# Patient Record
Sex: MALE | Race: WHITE | NOT HISPANIC OR LATINO | Employment: FULL TIME | URBAN - METROPOLITAN AREA
[De-identification: names, ages, dates, MRNs, and addresses within clinical notes are randomized per-mention and may not be internally consistent; named-entity substitution may affect disease eponyms.]

---

## 2020-01-27 ENCOUNTER — TRANSFERRED RECORDS (OUTPATIENT)
Dept: HEALTH INFORMATION MANAGEMENT | Facility: CLINIC | Age: 35
End: 2020-01-27

## 2020-01-27 ENCOUNTER — MEDICAL CORRESPONDENCE (OUTPATIENT)
Dept: HEALTH INFORMATION MANAGEMENT | Facility: CLINIC | Age: 35
End: 2020-01-27

## 2020-01-29 ENCOUNTER — PRE VISIT (OUTPATIENT)
Dept: NEUROLOGY | Facility: CLINIC | Age: 35
End: 2020-01-29

## 2020-01-29 NOTE — TELEPHONE ENCOUNTER
FUTURE VISIT INFORMATION      FUTURE VISIT INFORMATION:    Date: 3/11/2020    Time: 9AM    [place] [facility] [place]:    Referring provider:  Alka Richard    Referring providers clinic:  [facility]     Reason for visit/diagnosis  Migraines     RECORDS REQUESTED FROM:       Clinic name Comments Records Status Imaging Status   [facility]   Scanned to Media tab N/A     *No records In Care Everywhere*

## 2020-03-10 ASSESSMENT — ENCOUNTER SYMPTOMS
HEADACHES: 1
RECTAL PAIN: 1

## 2020-03-10 ASSESSMENT — HEADACHE IMPACT TEST (HIT 6)
WHEN YOU HAVE HEADACHES HOW OFTEN IS THE PAIN SEVERE: SOMETIMES
HIT6 TOTAL SCORE: 58
WHEN YOU HAVE A HEADACHE HOW OFTEN DO YOU WISH YOU COULD LIE DOWN: SOMETIMES
HOW OFTEN HAVE YOU FELT FED UP OR IRRITATED BECAUSE OF YOUR HEADACHES: SOMETIMES
HOW OFTEN DO HEADACHES LIMIT YOUR DAILY ACTIVITIES: SOMETIMES
HOW OFTEN DID HEADACHS LIMIT CONCENTRATION ON WORK OR DAILY ACTIVITY: SOMETIMES
HOW OFTEN HAVE YOU FELT TOO TIRED TO WORK BECAUSE OF YOUR HEADACHES: RARELY

## 2020-03-11 ENCOUNTER — OFFICE VISIT (OUTPATIENT)
Dept: NEUROLOGY | Facility: CLINIC | Age: 35
End: 2020-03-11
Payer: COMMERCIAL

## 2020-03-11 ENCOUNTER — HEALTH MAINTENANCE LETTER (OUTPATIENT)
Age: 35
End: 2020-03-11

## 2020-03-11 VITALS
BODY MASS INDEX: 26.25 KG/M2 | OXYGEN SATURATION: 97 % | TEMPERATURE: 98 F | HEART RATE: 77 BPM | WEIGHT: 187.5 LBS | SYSTOLIC BLOOD PRESSURE: 122 MMHG | HEIGHT: 71 IN | RESPIRATION RATE: 16 BRPM | DIASTOLIC BLOOD PRESSURE: 86 MMHG

## 2020-03-11 DIAGNOSIS — R51.9 CHRONIC DAILY HEADACHE: Primary | ICD-10-CM

## 2020-03-11 PROBLEM — G43.909 MIGRAINE HEADACHE: Status: ACTIVE | Noted: 2020-03-11

## 2020-03-11 RX ORDER — IBUPROFEN 200 MG
200 TABLET ORAL EVERY 4 HOURS PRN
COMMUNITY
End: 2020-05-01

## 2020-03-11 RX ORDER — AMITRIPTYLINE HYDROCHLORIDE 10 MG/1
10 TABLET ORAL AT BEDTIME
Qty: 30 TABLET | Refills: 6 | Status: SHIPPED | OUTPATIENT
Start: 2020-03-11 | End: 2020-05-01

## 2020-03-11 RX ORDER — CYCLOBENZAPRINE HCL 5 MG
5 TABLET ORAL AT BEDTIME
COMMUNITY
End: 2020-05-01

## 2020-03-11 RX ORDER — SUMATRIPTAN 100 MG/1
100 TABLET, FILM COATED ORAL
COMMUNITY
End: 2020-05-01

## 2020-03-11 RX ORDER — LEVETIRACETAM 1000 MG/1
1000 TABLET ORAL 2 TIMES DAILY
COMMUNITY
End: 2020-03-11

## 2020-03-11 ASSESSMENT — PAIN SCALES - GENERAL: PAINLEVEL: MILD PAIN (2)

## 2020-03-11 ASSESSMENT — MIFFLIN-ST. JEOR: SCORE: 1822.62

## 2020-03-11 NOTE — PATIENT INSTRUCTIONS
Plan:  Stop ibuprofen. May try naproxen 1-2 tabs every 12 hours for mild-moderate headache as needed and if tolerated. Limit use to no more than 10 days per month.   Stay hydrated   A trial of amitriptyline 10 mg at bedtime for headache prevention.   Stop cyclobenzaprine  Sumatriptan as needed for acute migraine headache. Limit use to no more than 9 days per month.   Follow up in 6-8 weeks or sooner if needed        Patient Education     Amitriptyline Hydrochloride Oral tablet  What is this medicine?  AMITRIPTYLINE (a john TRIP ti todd) is used to treat depression.  This medicine may be used for other purposes; ask your health care provider or pharmacist if you have questions.  What should I tell my health care provider before I take this medicine?  They need to know if you have any of these conditions:    an alcohol problem    asthma, difficulty breathing    bipolar disorder or schizophrenia    difficulty passing urine, prostate trouble    glaucoma    heart disease or previous heart attack    liver disease    over active thyroid    seizures    thoughts or plans of suicide, a previous suicide attempt, or family history of suicide attempt    an unusual or allergic reaction to amitriptyline, other medicines, foods, dyes, or preservatives    pregnant or trying to get pregnant    breast-feeding  How should I use this medicine?  Take this medicine by mouth with a drink of water. Follow the directions on the prescription label. You can take the tablets with or without food. Take your medicine at regular intervals. Do not take it more often than directed. Do not stop taking this medicine suddenly except upon the advice of your doctor. Stopping this medicine too quickly may cause serious side effects or your condition may worsen.  A special MedGuide will be given to you by the pharmacist with each prescription and refill. Be sure to read this information carefully each time.  Talk to your pediatrician regarding the use of  this medicine in children. Special care may be needed.  Overdosage: If you think you have taken too much of this medicine contact a poison control center or emergency room at once.  NOTE: This medicine is only for you. Do not share this medicine with others.  What if I miss a dose?  If you miss a dose, take it as soon as you can. If it is almost time for your next dose, take only that dose. Do not take double or extra doses.  What may interact with this medicine?  Do not take this medicine with any of the following medications:    arsenic trioxide    certain medicines used to regulate abnormal heartbeat or to treat other heart conditions    cisapride    droperidol    halofantrine    linezolid    MAOIs like Carbex, Eldepryl, Marplan, Nardil, and Parnate    methylene blue    other medicines for mental depression    phenothiazines like perphenazine, thioridazine and chlorpromazine    pimozide    probucol    procarbazine    sparfloxacin    Edmond's Wort    ziprasidone  This medicine may also interact with the following medications:    atropine and related drugs like hyoscyamine, scopolamine, tolterodine and others    barbiturate medicines for inducing sleep or treating seizures, like phenobarbital    cimetidine    disulfiram    ethchlorvynol    thyroid hormones such as levothyroxine  This list may not describe all possible interactions. Give your health care provider a list of all the medicines, herbs, non-prescription drugs, or dietary supplements you use. Also tell them if you smoke, drink alcohol, or use illegal drugs. Some items may interact with your medicine.  What should I watch for while using this medicine?  Tell your doctor if your symptoms do not get better or if they get worse. Visit your doctor or health care professional for regular checks on your progress. Because it may take several weeks to see the full effects of this medicine, it is important to continue your treatment as prescribed by your  doctor.  Patients and their families should watch out for new or worsening thoughts of suicide or depression. Also watch out for sudden changes in feelings such as feeling anxious, agitated, panicky, irritable, hostile, aggressive, impulsive, severely restless, overly excited and hyperactive, or not being able to sleep. If this happens, especially at the beginning of treatment or after a change in dose, call your health care professional.  You may get drowsy or dizzy. Do not drive, use machinery, or do anything that needs mental alertness until you know how this medicine affects you. Do not stand or sit up quickly, especially if you are an older patient. This reduces the risk of dizzy or fainting spells. Alcohol may interfere with the effect of this medicine. Avoid alcoholic drinks.  Do not treat yourself for coughs, colds, or allergies without asking your doctor or health care professional for advice. Some ingredients can increase possible side effects.  Your mouth may get dry. Chewing sugarless gum or sucking hard candy, and drinking plenty of water will help. Contact your doctor if the problem does not go away or is severe.  This medicine may cause dry eyes and blurred vision. If you wear contact lenses you may feel some discomfort. Lubricating drops may help. See your eye doctor if the problem does not go away or is severe.  This medicine can cause constipation. Try to have a bowel movement at least every 2 to 3 days. If you do not have a bowel movement for 3 days, call your doctor or health care professional.  This medicine can make you more sensitive to the sun. Keep out of the sun. If you cannot avoid being in the sun, wear protective clothing and use sunscreen. Do not use sun lamps or tanning beds/booths.  What side effects may I notice from receiving this medicine?  Side effects that you should report to your doctor or health care professional as soon as possible:    allergic reactions like skin rash,  itching or hives, swelling of the face, lips, or tongue    abnormal production of milk in females    breast enlargement in both males and females    breathing problems    confusion, hallucinations    fast, irregular heartbeat    fever with increased sweating    muscle stiffness, or spasms    pain or difficulty passing urine, loss of bladder control    seizures    suicidal thoughts or other mood changes    swelling of the testicles    tingling, pain, or numbness in the feet or hands    yellowing of the eyes or skin  Side effects that usually do not require medical attention (report to your doctor or health care professional if they continue or are bothersome):    change in sex drive or performance    constipation or diarrhea    nausea, vomiting    weight gain or loss  This list may not describe all possible side effects. Call your doctor for medical advice about side effects. You may report side effects to FDA at 8-386-FDA-8146.  Where should I keep my medicine?  Keep out of the reach of children.  Store at room temperature between 20 and 25 degrees C (68 and 77 degrees F). Throw away any unused medicine after the expiration date.  NOTE:This sheet is a summary. It may not cover all possible information. If you have questions about this medicine, talk to your doctor, pharmacist, or health care provider. Copyright  2016 Gold Standard

## 2020-03-11 NOTE — PROGRESS NOTES
"Re: Dustin Rainey      MRN# [id number]  YOB: 1985  Date of Visit:3/11/2020     OUTPATIENT NEUROLOGY VISIT NOTE    Chief Complaint:  Headache evaluation    History of Present Illness  Dustin Rainey is a 35-year-old male presents to the clinic today for headache evaluation.   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   Headache History:    Onset History:  Regularly for the past a couple of years years. Grandmother would get headaches.   Current Headache Pattern:  daily and typically takes advil and \"responsive\" to \"advil\" and intense headaches 1-2 times per month and lasting 1-2 days and sumatriptan helps to eliminate severe headaches       Aura: none     Associated Symptoms:  nausea and light sensitivity -with migraine headaches        Description of Headache Pain & Location:  Daily and migraine headaches are typically temples both or either side and in the back of his head -pain from a dull daily pain to pulsing at times, pain ranges 2-7/10 on the numeric pain scale    Do headaches interfere with or prevent usual activities or diminish your productivity at home or work? affected patient's work       Treatments Tried:    Ibuprofen 200-600 mg per day for at least a couple of years  Sumatriptan as needed and helps for severe migraine headaches  Flexeril as needed  Chiropractor   Nortriptyline in 2018 and did not work   No other preventive treatments  Have you needed to utilize the Emergency Room to treat your headache symptoms?  no    What makes your headaches better?  Sleeping headaches off  What makes your headaches worse or triggers your headaches? 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     Hit his head in middle school and got staples     Denies history of recent  head or neck trauma, " dizziness, vertigo, loss of consciousness, seizure, double vision, blurred vision, hearing difficulty, speech or swallowing difficulty, weakness or numbness in face, arms or legs, urinary or bowel incontinence, coordination problems or gait difficulty, fever or chills.    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        Review of Systems:   A 12-point ROS including constitutional, eyes, ENT, respiratory, cardiovascular, gastroenterology, genitourinary, integumentary, musculoskeletal, neurology, hematology and psychiatric were all reviewed with the patient and completed at the Neuroscience Services Question nary and as mentioned in the HPI.   Answers for HPI/ROS submitted by the patient on 3/10/2020   General Symptoms: No  Skin Symptoms: No  HENT Symptoms: No  EYE SYMPTOMS: No  HEART SYMPTOMS: No  LUNG SYMPTOMS: No  INTESTINAL SYMPTOMS: Yes  URINARY SYMPTOMS: No  REPRODUCTIVE SYMPTOMS: No  SKELETAL SYMPTOMS: No  BLOOD SYMPTOMS: No  NERVOUS SYSTEM SYMPTOMS: Yes  MENTAL HEALTH SYMPTOMS: No  Headache: Yes    General Exam:   /86 (BP Location: Left arm, Patient Position: Chair, Cuff Size: Adult Large)    "Pulse 77   Temp 98  F (36.7  C) (Oral)   Resp 16   Ht 1.803 m (5' 11\")   Wt 85 kg (187 lb 8 oz)   SpO2 97%   BMI 26.15 kg/m    GEN: Awake, NAD; good eye contact, responses appropriately, healthy appearing   HEENT: Head atraumatic/Normocephalic. Scalp normal. Pupils equally round, 4 mm, reactive to light and accommodation, sclera and conjunctiva normal. Fundoscopic examination reveals normal vessels no papilledema.   Neck: Easily moveable without resistance  Heart: S1/S2 appreciated, RRR, no m/r/g, no carotid bruits  Lungs:Lungs are clear to auscultation bilaterally, no wheezes or crackles.    Neurological Examination:  The patient is alert and oriented times four. Has good attention and concentration.  Speech is fluent without dysarthria.   Cranial nerves:  CN I deferred.   CN II: Intact and full visual fields to confrontation bilaterally. CN III, IV, VI: EOM intact. There is no nystagmus. Has conjugated gaze. Intact direct and consensual pupillary light reflexes.   CN V: Intact and symmetrical to facial sensation in the V1 through V3 bilaterally.   CN VII: Intact and symmetrical eyebrow and lid raise and eyelid closure, smiles and frown.   CN VIII: Intact to finger rub bilaterally.   CN IX and X: The palates elevates symmetrical. The uvula is midline.   CN XII: The tongue protrudes midline with no atrophy or fasciculations.   Motor exam: The patient has a normal bulk and tone throughout. There is no atrophy, fasciculations, clonus, or abnormal movements appreciated.   Strength Exam:  5/5 strength at shoulder abduction, elbow flexion or extension, wrist flexion or extension, finger abduction, , hip flexion and extension, knee flexion and extension, and dorsiflexion and plantarflexion bilaterally.   Sensation is intact to light touch  throughout. Reflexes are 2+ and symmetrical at biceps, triceps, brachioradialis, patellar, and Achilles.   Coordination reveals finger-nose-finger with normal speed and " accuracy.   Station and gait is normal. There is no ataxia. Can walk on the toes, heels, and tandem walk without difficulty.Has no drift and a negative Romberg.     Assessment and Plan:  Headache mixed etiology-chronic daily headaches,  migraine headaches and possible analgesics overuse headaches On exam he has normal vital signs.  His mental status is normal.  His funduscopic exam is normal.  His neck is supple.  His neurologic exam is normal.The remainder of a complete physical exam is normal.    Plan discussed with the patient:  Stop ibuprofen. May try naproxen 1-2 tabs every 12 hours for mild-moderate headache as needed and if tolerated. Limit use to no more than 10 days per month.   Stay hydrated   A trial of amitriptyline 10 mg at bedtime for headache prevention.   Stop cyclobenzaprine  Sumatriptan as needed for acute migraine headache. Limit use to no more than 9 days per month.   Follow up in 6-8 weeks or sooner if needed    Prescription amitriptyline  provided. Correct use and course provided. Expected benefits and typical side effects reviewed. Safety of concomitant medications and interactions reviewed. Patient taught signs and symptoms of adverse reactions and allergies. Patient understands teaching and accepts risks of prescribed medication regimen.    I discussed all my recommendation with Dustin Rainey. The patient verbalizes understanding and comfortable with the plan. The patient has our clinic phone number to call with any questions or concerns. All of the patient's questions were answered from the best of my current knowledge.     Thank you for letting me be a part of the treatment team for Dustin Rainey      Time spent with pt answering questions, discussing findings, counseling and coordinating care was more than 50% the appointment time, 52  minutes.         KEVIN Shaw, CNP  [facility]

## 2020-03-11 NOTE — NURSING NOTE
Chief Complaint   Patient presents with     Headache     [facility] NEW HEADACHE MIGRAINES     Maryana Menezes CMA

## 2020-03-11 NOTE — LETTER
"3/11/2020       RE: Dustin Rainey  [address]  [address]  [place] [address]     Dear Colleague,    Thank you for referring your patient, Dustin Rainey, to the [facility] at [facility]. Please see a copy of my visit note below.    Re: Dustin Rainey      MRN# [id number]  YOB: 1985  Date of Visit:3/11/2020     OUTPATIENT NEUROLOGY VISIT NOTE    Chief Complaint:  Headache evaluation    History of Present Illness  Dustin Rainey is a 35-year-old male presents to the clinic today for headache evaluation.   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   Headache History:    Onset History:  Regularly for the past a couple of years years. Grandmother would get headaches.   Current Headache Pattern:  daily and typically takes advil and \"responsive\" to \"advil\" and intense headaches 1-2 times per month and lasting 1-2 days and sumatriptan helps to eliminate severe headaches       Aura: none     Associated Symptoms:  nausea and light sensitivity -with migraine headaches        Description of Headache Pain & Location:  Daily and migraine headaches are typically temples both or either side and in the back of his head -pain from a dull daily pain to pulsing at times, pain ranges 2-7/10 on the numeric pain scale    Do headaches interfere with or prevent usual activities or diminish your productivity at home or work? affected patient's work       Treatments Tried:    Ibuprofen 200-600 mg per day for at least a couple of years  Sumatriptan as needed and helps for severe migraine headaches  Flexeril as needed  Chiropractor   Nortriptyline in 2018 and did not work   No other preventive treatments  Have you needed to utilize the Emergency Room to treat your headache symptoms?  no    What makes your headaches better?  Sleeping headaches off  What makes your headaches worse or triggers your headaches? 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     Hit his head in middle school and got staples     Denies history of recent  head or neck trauma, dizziness, vertigo, loss of consciousness, seizure, double vision, blurred vision, hearing difficulty, speech or swallowing difficulty, weakness or numbness in face, arms or legs, urinary or bowel incontinence, coordination problems or gait difficulty, fever or chills.    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        Review of Systems:   A 12-point ROS including constitutional, eyes, ENT, respiratory, cardiovascular, gastroenterology, genitourinary, integumentary, musculoskeletal, neurology, hematology and psychiatric were all reviewed with the patient and completed at the Neuroscience Services Question nary and as mentioned in the HPI.   Answers for HPI/ROS submitted by the patient on 3/10/2020   General Symptoms: No  Skin Symptoms: No  HENT Symptoms: No  EYE SYMPTOMS: No  HEART SYMPTOMS: No  LUNG SYMPTOMS:  "No  INTESTINAL SYMPTOMS: Yes  URINARY SYMPTOMS: No  REPRODUCTIVE SYMPTOMS: No  SKELETAL SYMPTOMS: No  BLOOD SYMPTOMS: No  NERVOUS SYSTEM SYMPTOMS: Yes  MENTAL HEALTH SYMPTOMS: No  Headache: Yes    General Exam:   /86 (BP Location: Left arm, Patient Position: Chair, Cuff Size: Adult Large)   Pulse 77   Temp 98  F (36.7  C) (Oral)   Resp 16   Ht 1.803 m (5' 11\")   Wt 85 kg (187 lb 8 oz)   SpO2 97%   BMI 26.15 kg/m    GEN: Awake, NAD; good eye contact, responses appropriately, healthy appearing   HEENT: Head atraumatic/Normocephalic. Scalp normal. Pupils equally round, 4 mm, reactive to light and accommodation, sclera and conjunctiva normal. Fundoscopic examination reveals normal vessels no papilledema.   Neck: Easily moveable without resistance  Heart: S1/S2 appreciated, RRR, no m/r/g, no carotid bruits  Lungs:Lungs are clear to auscultation bilaterally, no wheezes or crackles.    Neurological Examination:  The patient is alert and oriented times four. Has good attention and concentration.  Speech is fluent without dysarthria.   Cranial nerves:  CN I deferred.   CN II: Intact and full visual fields to confrontation bilaterally. CN III, IV, VI: EOM intact. There is no nystagmus. Has conjugated gaze. Intact direct and consensual pupillary light reflexes.   CN V: Intact and symmetrical to facial sensation in the V1 through V3 bilaterally.   CN VII: Intact and symmetrical eyebrow and lid raise and eyelid closure, smiles and frown.   CN VIII: Intact to finger rub bilaterally.   CN IX and X: The palates elevates symmetrical. The uvula is midline.   CN XII: The tongue protrudes midline with no atrophy or fasciculations.   Motor exam: The patient has a normal bulk and tone throughout. There is no atrophy, fasciculations, clonus, or abnormal movements appreciated.   Strength Exam:  5/5 strength at shoulder abduction, elbow flexion or extension, wrist flexion or extension, finger abduction, , hip flexion and " extension, knee flexion and extension, and dorsiflexion and plantarflexion bilaterally.   Sensation is intact to light touch  throughout. Reflexes are 2+ and symmetrical at biceps, triceps, brachioradialis, patellar, and Achilles.   Coordination reveals finger-nose-finger with normal speed and accuracy.   Station and gait is normal. There is no ataxia. Can walk on the toes, heels, and tandem walk without difficulty.Has no drift and a negative Romberg.     Assessment and Plan:  Headache mixed etiology-chronic daily headaches,  migraine headaches and possible analgesics overuse headaches On exam he has normal vital signs.  His mental status is normal.  His funduscopic exam is normal.  His neck is supple.  His neurologic exam is normal.The remainder of a complete physical exam is normal.    Plan discussed with the patient:  Stop ibuprofen. May try naproxen 1-2 tabs every 12 hours for mild-moderate headache as needed and if tolerated. Limit use to no more than 10 days per month.   Stay hydrated   A trial of amitriptyline 10 mg at bedtime for headache prevention.   Stop cyclobenzaprine  Sumatriptan as needed for acute migraine headache. Limit use to no more than 9 days per month.   Follow up in 6-8 weeks or sooner if needed    Prescription amitriptyline  provided. Correct use and course provided. Expected benefits and typical side effects reviewed. Safety of concomitant medications and interactions reviewed. Patient taught signs and symptoms of adverse reactions and allergies. Patient understands teaching and accepts risks of prescribed medication regimen.    I discussed all my recommendation with Dustin Rainey. The patient verbalizes understanding and comfortable with the plan. The patient has our clinic phone number to call with any questions or concerns. All of the patient's questions were answered from the best of my current knowledge.     Thank you for letting me be a part of the treatment team for Dustin  Redd      Time spent with pt answering questions, discussing findings, counseling and coordinating care was more than 50% the appointment time, 52  minutes.         KEVIN Shaw, CNP  [facility]

## 2020-03-24 ENCOUNTER — TELEPHONE (OUTPATIENT)
Dept: NEUROLOGY | Facility: CLINIC | Age: 35
End: 2020-03-24

## 2020-03-24 DIAGNOSIS — G43.011 INTRACTABLE MIGRAINE WITHOUT AURA AND WITH STATUS MIGRAINOSUS: Primary | ICD-10-CM

## 2020-03-24 RX ORDER — PROCHLORPERAZINE MALEATE 5 MG
5 TABLET ORAL EVERY 6 HOURS PRN
Qty: 20 TABLET | Refills: 6 | Status: SHIPPED | OUTPATIENT
Start: 2020-03-24 | End: 2020-05-01

## 2020-03-24 RX ORDER — RIZATRIPTAN BENZOATE 5 MG/1
5-10 TABLET, ORALLY DISINTEGRATING ORAL
Qty: 18 TABLET | Refills: 6 | Status: SHIPPED | OUTPATIENT
Start: 2020-03-24 | End: 2020-10-21

## 2020-03-24 RX ORDER — METHYLPREDNISOLONE 4 MG
TABLET, DOSE PACK ORAL
Qty: 21 TABLET | Refills: 0 | Status: SHIPPED | OUTPATIENT
Start: 2020-03-24 | End: 2020-05-01

## 2020-05-01 ENCOUNTER — VIRTUAL VISIT (OUTPATIENT)
Dept: NEUROLOGY | Facility: CLINIC | Age: 35
End: 2020-05-01
Payer: COMMERCIAL

## 2020-05-01 DIAGNOSIS — G43.009 MIGRAINE WITHOUT AURA AND WITHOUT STATUS MIGRAINOSUS, NOT INTRACTABLE: Primary | ICD-10-CM

## 2020-05-01 DIAGNOSIS — R51.9 CHRONIC DAILY HEADACHE: ICD-10-CM

## 2020-05-01 RX ORDER — ONDANSETRON 4 MG/1
4 TABLET, ORALLY DISINTEGRATING ORAL EVERY 8 HOURS PRN
Qty: 20 TABLET | Refills: 3 | Status: SHIPPED | OUTPATIENT
Start: 2020-05-01 | End: 2021-04-22

## 2020-05-01 RX ORDER — AMITRIPTYLINE HYDROCHLORIDE 10 MG/1
20 TABLET ORAL AT BEDTIME
Qty: 60 TABLET | Refills: 6 | Status: SHIPPED | OUTPATIENT
Start: 2020-05-01 | End: 2022-07-22

## 2020-05-01 NOTE — PATIENT INSTRUCTIONS
Plan:  Headache log for frequency and severity and duration  Headache prevention-increase amitriptyline to 20 mg at bedtime if tolerated   Acute migraine treatment -rizatriptan 10 mg ODT at migraine onset and may repeat in 2 hours as needed. Max 30 mg in 24 hours. Ondansetron for nausea as needed.   Ibuprofen or naproxen for mild to moderate headache but limit use to no more than 10 days per month.   Stay hydrated  Vitamin B2 (riboflavin) 200 mg daily OTC   Follow up in 6-8 weeks or sooner if needed

## 2020-05-01 NOTE — PROGRESS NOTES
Video-Visit Details    Type of service:  Video Visit [email or web address]    Video Start Time (time video started):9:21 AM    Video End Time (time video stopped): 9:42 AM      Originating Location (pt. Location): Home    Distant Location (provider location):  [facility] NEUROLOGY     Mode of Communication:  Video Conference via [employer]    Physician has received verbal consent for a Video Visit from the patient? Yes        OUTPATIENT NEUROLOGY VISIT NOTE    Reason for Visit:  Headache follow-up. This visit was conducted via synchronous video visit due to the current COVID-19 crisis to reduce patient risk.  Verbal consent was obtained.     Interval History  Dustin Rainey is a 35-year-old male presents to the clinic today for headache follow up.   Initial [facility] evaluation on 3/04/2020, see note for details.   Today patient reports that he stopped taking advil and daily headaches stopped but has been getting more severe headache every 3-5 days and reports that rizatriptan. Patient reports that rizatriptan works most of the time and takes 5 mg and has to repeat at times. Reports nausea at times and ondasetron helps. No side effects with rizatriptan except nausea.   At least once per week more severe headache and lasting within 30 min to an hour or sometimes more severe lasting a day or so.   Patient has been taking amitriptyline 10 mg at bedtime and not sure of benefit but no side effects.       Plan discussed with the patient:  Headache log for frequency and severity and duration  Headache prevention-increase amitriptyline to 20 mg at bedtime if tolerated   Acute migraine treatment -rizatriptan 10 mg ODT at migraine onset and may repeat in 2 hours as needed. Max 30 mg in 24 hours. Ondansetron for nausea as needed.   Ibuprofen or naproxen for mild to moderate headache but limit use to no more than 10 days per month.   Stay hydrated  Vitamin B2 (riboflavin) 200 mg daily OTC   Follow up in 6-8 weeks or  sooner if needed     Past Medical History reviewed   Social History     Tobacco Use     Smoking status: Not on file   Substance Use Topics     Alcohol use: Not on file    reviewed and verified with the patient   No Known Allergies    reviewed and verified with the patient  Current Outpatient Medications   Medication     amitriptyline (ELAVIL) 10 MG tablet     ondansetron (ZOFRAN-ODT) 4 MG ODT tab     rizatriptan (MAXALT-MLT) 5 MG ODT     No current facility-administered medications for this visit.      Review of Systems:   A 10-point ROS including constitutional, eyes, respiratory, cardiovascular, gastroenterology, genitourinary, integumentary, musculoskeletal, neurology and psychiatric were all reviewed and except as mentioned in the interval history.     General Exam:   There were no vitals taken for this visit.  GEN: Awake, NAD; good eye contact, responses appropriately  Speech is fluent without dysarthria.Face is symmetrical. Intact and symmetrical eyebrow and lid raise and eyelid closure, smiles. Hearing Intact to conversation speech. The palates elevates symmetrical.     Assessment and Plan:  See Interval History for our discussion and plan.     Prescription refills for ondansetron and amitriptyline  provided. Correct use and course provided. Expected benefits and typical side effects reviewed. Safety of concomitant medications and interactions reviewed. Patient taught signs and symptoms of adverse reactions and allergies. Patient understands teaching and accepts risks of prescribed medication regimen.      I discussed all my recommendation with Dustin Rainey. The patient verbalizes understanding and comfortable with the plan. The patient has our clinic phone number to call with any questions or concerns. All of the patient's questions were answered from the best of my current knowledge.     Time spent with pt answering questions, discussing findings, counseling and coordinating care was more than 50% the  appointment time, 21  minutes.         KEVIN Shaw, CNP  [facility] Neurology [facility]

## 2020-06-17 ENCOUNTER — VIRTUAL VISIT (OUTPATIENT)
Dept: NEUROLOGY | Facility: CLINIC | Age: 35
End: 2020-06-17
Payer: COMMERCIAL

## 2020-06-17 DIAGNOSIS — G43.009 MIGRAINE WITHOUT AURA AND WITHOUT STATUS MIGRAINOSUS, NOT INTRACTABLE: Primary | ICD-10-CM

## 2020-06-17 NOTE — PROGRESS NOTES
"Dustin Rainey is a 35 year old male who is being evaluated via a billable video visit.      The patient has been notified of following:     \"This video visit will be conducted via a call between you and your physician/provider. We have found that certain health care needs can be provided without the need for an in-person physical exam.  This service lets us provide the care you need with a video conversation.  If a prescription is necessary we can send it directly to your pharmacy.  If lab work is needed we can place an order for that and you can then stop by our lab to have the test done at a later time.    Video visits are billed at different rates depending on your insurance coverage.  Please reach out to your insurance provider with any questions.    If during the course of the call the physician/provider feels a video visit is not appropriate, you will not be charged for this service.\"    Patient has given verbal consent for Video visit? Yes    Will anyone else be joining your video visit? No        Video-Visit Details    Type of service:  Video Visit    Video Start Time: 2:44 PM  Video End Time: 2:59 PM    Originating Location (pt. Location): Home    Distant Location (provider location):  [facility]     Platform used for Video Visit: "[employer]"    Interval History:  Headache follow-up. This visit was conducted via synchronous video visit due to the current COVID-19 crisis to reduce patient risk.  Verbal consent was obtained.     Interval History:  This is a 35-year old male who presents to the [facility] for headache follow up.   Initial Headache Clinic visit on 3/11/2020, see note for details  Patient reports that he still gets headaches every couple of days but generally on the milder side. Patient reports that headaches are still in the afternoon but severity less and there 3-4 days headache free days. Patient reports that increasing amitriptyline to 20 mg at bedtime and denies any side " effects. Patient reports that he is opened to try to increase amitriptyline dose to 25 mg   Rizatriptan helps.     Plan discussed with the patient:  Headache log for frequency and severity and duration  Increase amitriptyline to 30 mg at bedtime for headache prevention and side effects reviewed  Acute migraine treatment -rizatriptan 10 mg ODT at migraine onset and may repeat in 2 hours as needed. Max 30 mg in 24 hours. Ondansetron for nausea as needed.   Ibuprofen or naproxen for mild to moderate headache but limit use to no more than 10 days per month.   Stay hydrated  Vitamin B2 (riboflavin) 200 mg daily OTC   Follow up in 6-8 weeks via video or sooner if needed       Allergies, Current medications and allergies reviewed    Exam: Does not appear in any acute distress, bight effect and healthy appearing. Questions answers appropriately, face is symmetrical, intact EOM    I spent a total of 9 minutes for telemedicine consult with Dustin Rainey during today s video meeting. Over 50% of this time was spent counseling the patient and/or coordinating care      KEVIN Leal CNP

## 2020-06-17 NOTE — LETTER
"6/17/2020       RE: Dustin Rainey  [address]  [address]  [place] [address]     Dear Colleague,    Thank you for referring your patient, Dustin Rainey, to the [facility] NEUROLOGY at [facility]. Please see a copy of my visit note below.    Dustin Rainey is a 35 year old male who is being evaluated via a billable video visit.      The patient has been notified of following:     \"This video visit will be conducted via a call between you and your physician/provider. We have found that certain health care needs can be provided without the need for an in-person physical exam.  This service lets us provide the care you need with a video conversation.  If a prescription is necessary we can send it directly to your pharmacy.  If lab work is needed we can place an order for that and you can then stop by our lab to have the test done at a later time.    Video visits are billed at different rates depending on your insurance coverage.  Please reach out to your insurance provider with any questions.    If during the course of the call the physician/provider feels a video visit is not appropriate, you will not be charged for this service.\"    Patient has given verbal consent for Video visit? Yes    Will anyone else be joining your video visit? No        Video-Visit Details    Type of service:  Video Visit    Video Start Time: 2:44 PM  Video End Time: 2:59 PM    Originating Location (pt. Location): Home    Distant Location (provider location):  [facility] NEUROLOGY     Platform used for Video Visit: [employer]    Interval History:  Headache follow-up. This visit was conducted via synchronous video visit due to the current COVID-19 crisis to reduce patient risk.  Verbal consent was obtained.     Interval History:  This is a 35-year old male who presents to the [facility] for headache follow up.   Initial Headache Clinic visit on 3/11/2020, see note for " details  Patient reports that he still gets headaches every couple of days but generally on the milder side. Patient reports that headaches are still in the afternoon but severity less and there 3-4 days headache free days. Patient reports that increasing amitriptyline to 20 mg at bedtime and denies any side effects. Patient reports that he is opened to try to increase amitriptyline dose to 25 mg   Rizatriptan helps.     Plan discussed with the patient:  Headache log for frequency and severity and duration  Increase amitriptyline to 30 mg at bedtime for headache prevention and side effects reviewed  Acute migraine treatment -rizatriptan 10 mg ODT at migraine onset and may repeat in 2 hours as needed. Max 30 mg in 24 hours. Ondansetron for nausea as needed.   Ibuprofen or naproxen for mild to moderate headache but limit use to no more than 10 days per month.   Stay hydrated  Vitamin B2 (riboflavin) 200 mg daily OTC   Follow up in 6-8 weeks via video or sooner if needed       Allergies, Current medications and allergies reviewed    Exam: Does not appear in any acute distress, bight effect and healthy appearing. Questions answers appropriately, face is symmetrical, intact EOM    I spent a total of 9 minutes for telemedicine consult with Dustin Rainey during today s video meeting. Over 50% of this time was spent counseling the patient and/or coordinating care      KEVIN Leal CNP

## 2020-07-13 ENCOUNTER — VIRTUAL VISIT (OUTPATIENT)
Dept: NEUROLOGY | Facility: CLINIC | Age: 35
End: 2020-07-13
Payer: COMMERCIAL

## 2020-07-13 DIAGNOSIS — R51.9 WORSENING HEADACHES: Primary | ICD-10-CM

## 2020-07-13 NOTE — PROGRESS NOTES
"Dustin Rainey is a 35 year old male who is being evaluated via a billable video visit.      The patient has been notified of following:     \"This video visit will be conducted via a call between you and your physician/provider. We have found that certain health care needs can be provided without the need for an in-person physical exam.  This service lets us provide the care you need with a video conversation.  If a prescription is necessary we can send it directly to your pharmacy.  If lab work is needed we can place an order for that and you can then stop by our lab to have the test done at a later time.    Video visits are billed at different rates depending on your insurance coverage.  Please reach out to your insurance provider with any questions.    If during the course of the call the physician/provider feels a video visit is not appropriate, you will not be charged for this service.\"    Patient has given verbal consent for Video visit? Yes  How would you like to obtain your AVS? [employer]  Patient would like the video invitation sent by: [employer]  Will anyone else be joining your video visit? No        Video-Visit Details    Type of service:  Video Visit    Video Start Time: 2:11 PM  Video End Time: 2:37 PM    Originating Location (pt. Location): Home    Distant Location (provider location):  [facility] NEUROLOGY     Platform used for Video Visit: [employer]  Reason for visit:  Headache follow-up. This visit was conducted via synchronous video visit due to the current COVID-19 crisis to reduce patient risk.  Verbal consent was obtained.     Interval history:  This is a 35-year-old male who presents to [facility] headache clinic for headache  follow-up.   Initial virtual headache clinic visit on 3/11/2020, see note for details.  Patient was started on amitriptyline for headache prevention at his initial visit.   Today patient reports that since increasing amitriptyline to 30 mg he " noticed a profound effect and lack of energy. June 16th -July beginning he was taking Excedrin most of days and beginning of July has been taking Excedrin daily. Patient reports that there are periods of time.   Patient reports that there nothing concerning but re-occurring headache and reports that he has not been taking excedrin daily  until June he starts taking Excedrin daily and worsen since July.   Reports that headache a little bit better with laying down and closing his eyes. Pain is localized to the right side of his head or behind right eye and in the back of his eye. Reports that putting pressure  over his right eye he gets some relief.   Reports that there are some times in the past with a severe headaches but no severe headaches for a couple of month since amitriptyline. But daily headaches have increased.   Reports that when he has a headache he cannot stand anymore he lays down. Patient reports that typically in the last couple of month he did not need to lay down.   Patient reports that there is some light sensitivity and feels better when light dimmed. Does not wake up with headache usually and headache reoccurs in the afternoon.      Worsening headaches and not responding to preventive treatment. Headaches are often on the right side but possibly on the left side. No dysautonomic symptoms reported.     Plan:  Brain MRI with and without contrast  Follow up in Clinic/person after the imaging.     The plan was discussed with the patient who verbalized understanding Comfortable with the plan.       PMH, allergies and current prescription medications reviewed    10 point ROS of systems including Constitutional, Eyes, Respiratory, Cardiovascular, Gastroenterology, Genitourinary, Integumentary, Muscularskeletal, Psychiatric were reviewed  as mentioned in the HPI    Exam   GENERAL: Healthy, alert and no distress  RESP: No audible wheeze, cough, or visible cyanosis.  No visible retractions or increased work  of breathing.    NEURO: Facial expressions appear to be intact , symmetrical smile PSYCH: Mentation appears normal, affect normal/bright, judgement and insight intact, normal speech and appearance well-groomed.    Assessment and plan:  Persistent headaches and reported worsening of the headaches.  Recommended brain MRI for any subtle secondary causes of patient headache worsening symptoms.    Patient verbalizes understanding of our discussion and would like to proceed with the plan.    Past medical history, current prescription medications and allergies were reviewed with the patient    I spent a total of 25 minutes for telemedicine consult with Dustin Rainey during today s video meeting. Over 50% of this time was spent counseling the patient and/or coordinating care    KEVIN Leal [facility] headache clinic

## 2020-07-13 NOTE — LETTER
7/13/2020       RE: Dustin Rainey  [address]  [address]  [place] [address]     Dear Colleague,    Thank you for referring your patient, Dustin Rainey, to the [facility] NEUROLOGY at [facility]. Please see a copy of my visit note below.    Dustin Rainey is a 35 year old male who is being evaluated via a billable video visit.        Video-Visit Details    Type of service:  Video Visit    Video Start Time: 2:11 PM  Video End Time: 2:37 PM    Originating Location (pt. Location): Home    Distant Location (provider location):  [facility] NEUROLOGY     Platform used for Video Visit: Bethanie Rainey, EMT  Reason for visit:  Headache follow-up. This visit was conducted via synchronous video visit due to the current COVID-19 crisis to reduce patient risk.  Verbal consent was obtained.     Interval history:  This is a 35-year-old male who presents to [facility] virtual headache clinic for headache  follow-up.   Initial virtual headache clinic visit on 3/11/2020, see note for details.  Patient was started on amitriptyline for headache prevention at his initial visit.   Today patient reports that since increasing amitriptyline to 30 mg he noticed a profound effect and lack of energy. June 16th -July beginning he was taking Excedrin most of days and beginning of July has been taking Excedrin daily. Patient reports that there are periods of time.   Patient reports that there nothing concerning but re-occurring headache and reports that he has not been taking excedrin daily  until June he starts taking Excedrin daily and worsen since July.   Reports that headache a little bit better with laying down and closing his eyes. Pain is localized to the right side of his head or behind right eye and in the back of his eye. Reports that putting pressure  over his right eye he gets some relief.   Reports that there are some times in the past with a severe headaches but no severe  headaches for a couple of month since amitriptyline. But daily headaches have increased.   Reports that when he has a headache he cannot stand anymore he lays down. Patient reports that typically in the last couple of month he did not need to lay down.   Patient reports that there is some light sensitivity and feels better when light dimmed. Does not wake up with headache usually and headache reoccurs in the afternoon.      Worsening headaches and not responding to preventive treatment. Headaches are often on the right side but possibly on the left side. No dysautonomic symptoms reported.     Plan:  Brain MRI with and without contrast  Follow up in Clinic/person after the imaging.     The plan was discussed with the patient who verbalized understanding Comfortable with the plan.       PMH, allergies and current prescription medications reviewed    10 point ROS of systems including Constitutional, Eyes, Respiratory, Cardiovascular, Gastroenterology, Genitourinary, Integumentary, Muscularskeletal, Psychiatric were reviewed  as mentioned in the HPI    Exam   GENERAL: Healthy, alert and no distress  RESP: No audible wheeze, cough, or visible cyanosis.  No visible retractions or increased work of breathing.    NEURO: Facial expressions appear to be intact , symmetrical smile PSYCH: Mentation appears normal, affect normal/bright, judgement and insight intact, normal speech and appearance well-groomed.    Assessment and plan:  Persistent headaches and reported worsening of the headaches.  Recommended brain MRI for any subtle secondary causes of patient headache worsening symptoms.    Patient verbalizes understanding of our discussion and would like to proceed with the plan.    Past medical history, current prescription medications and allergies were reviewed with the patient    I spent a total of 25 minutes for telemedicine consult with Dustin Rainey during today s video meeting. Over 50% of this time was spent  counseling the patient and/or coordinating care    KEVIN Leal [facility]

## 2020-08-26 ENCOUNTER — ANCILLARY PROCEDURE (OUTPATIENT)
Dept: MRI IMAGING | Facility: CLINIC | Age: 35
End: 2020-08-26
Attending: NURSE PRACTITIONER
Payer: COMMERCIAL

## 2020-08-26 ENCOUNTER — OFFICE VISIT (OUTPATIENT)
Dept: NEUROLOGY | Facility: CLINIC | Age: 35
End: 2020-08-26
Attending: NURSE PRACTITIONER
Payer: COMMERCIAL

## 2020-08-26 VITALS
RESPIRATION RATE: 16 BRPM | OXYGEN SATURATION: 98 % | SYSTOLIC BLOOD PRESSURE: 127 MMHG | HEART RATE: 73 BPM | DIASTOLIC BLOOD PRESSURE: 85 MMHG

## 2020-08-26 DIAGNOSIS — R51.9 WORSENING HEADACHES: ICD-10-CM

## 2020-08-26 DIAGNOSIS — G43.719 INTRACTABLE CHRONIC MIGRAINE WITHOUT AURA AND WITHOUT STATUS MIGRAINOSUS: Primary | ICD-10-CM

## 2020-08-26 RX ORDER — PROPRANOLOL HCL 60 MG
60 CAPSULE, EXTENDED RELEASE 24HR ORAL AT BEDTIME
Qty: 30 CAPSULE | Refills: 3 | Status: SHIPPED | OUTPATIENT
Start: 2020-08-26 | End: 2022-07-22

## 2020-08-26 RX ORDER — GADOBUTROL 604.72 MG/ML
7.5 INJECTION INTRAVENOUS ONCE
Status: COMPLETED | OUTPATIENT
Start: 2020-08-26 | End: 2020-08-26

## 2020-08-26 RX ADMIN — GADOBUTROL 7.5 ML: 604.72 INJECTION INTRAVENOUS at 12:20

## 2020-08-26 ASSESSMENT — PAIN SCALES - GENERAL: PAINLEVEL: NO PAIN (0)

## 2020-08-26 NOTE — LETTER
8/26/2020       RE: Dustin Rainey  [address]  [address]  [place] [facility] [address]     Dear Colleague,    Thank you for referring your patient, Dustin Rainey, to the [facility] NEUROLOGY at [facility]. Please see a copy of my visit note below.    Re: Dustin Rainey      MRN# [id number]  YOB: 1985  Date of Visit: 8/26/2020    OUTPATIENT NEUROLOGY VISIT NOTE    Reason for Visit:  Headache follow-up    Interval History:  Dustin Rainey is a 35-year-old male presents to the [facility] headache clinic today for headache follow-up.  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  Headaches persistent for the past couple of years.  Family history of headaches grandmother.  Headaches daily with frequency 30 a.m. days out of 30 days/months with 1-2 per month severe migraine with duration 6 to 8 hours.  Associated with features of lightheadedness, nausea.  Daily headaches typical renal late morning or afternoon.  Headaches location is usually on the right temple and back of the head.  Daily headaches rates 2-5/10 on the numeric pain scale and duration several hours.  Patient takes Advil or Excedrin frequently and rizatriptan as needed which helps with most severe migraine headaches.  Patient was started amitriptyline in March and did try to increase amitriptyline to 30 mg at bedtime but did not tolerate it.  Patient is currently takes amitriptyline 20 mg at bedtime.  Patient did try nortriptyline in the past and it did not work.  Patient had his brain MRI with and without contrast completed today and we reviewed his brain MRI images and no acute findings at this time to explain patient's persistent headaches.   We discussed frequent acute analgesics use and possible rebound headaches.  Patient was advised to use acute analgesics with total use no more than 14days/months.   Patient was asked to wean off ibuprofen use temporarily and reduce his Excedrin use.  We discussed changes in his preventive treatment and a trial of propranolol.  Patient denies any history of heart disease or any other M pertinent medical history.  No apparent contraindications to try propranolol.  No history of severe depression reported.  Propranolol side effects reviewed with the patient.  We discussed use of CGRP's or Botox injections in addition to his current treatment in the future if no response to propranolol.    Headache treatment plan discussed with the patient:  Headache prevention  Exercise 3 days per week for 30 minutes at least   Vitamin D 800 units OTC daily   Vitamin B2 (riboflavin) 200-400 mg daily OTC  Wean off amitriptyline once stable on propranolol   A trial of propranolol 60 mg ER at bedtime Side effects-fatigue, drowsiness, exercise intolerance, cold feet or hands, dizziness and low blood pressure or heart rate. Stop using is any intolerance or chest pain, low heart rate, significant fatigue, shortness of breath or worsening depression.   Wean off ibuprofen/advil use and stop it  Wean off Excedrin use to no more than 8-10 days per month or stop it  May take naproxen (Aleve) 1-2 tabs every 12 hours as needed. Limit use to no more than 14 days per month  Rizatriptan as needed but limit use to no more than 9 days per month  Follow up in 4-6 weeks via video or sooner if needed    Neurodiagnostic Testing   MR BRAIN W/O & W CONTRAST 8/26/2020 12:57 PM     Provided History: worsening headache; Worsening headaches.  ICD-10: Worsening headaches     Comparison: None.     Technique: Multiplanar T1-weighted, axial FLAIR, and susceptibility  images were obtained without intravenous contrast. Following  intravenous gadolinium-based contrast administration, axial  T2-weighted, diffusion, and T1-weighted images (in multiple planes)  were obtained.     Contrast: 7.5 mL Gadavist      Findings:  There is no  mass effect, midline shift, or evidence of intracranial  hemorrhage. The ventricles are proportionate to the cerebral sulci.  Normal major vascular intracranial flow-voids. Small right  temporo-occipital DVA.     Postcontrast images demonstrate no abnormal intracranial enhancement.     No abnormality of the skull marrow signal. The visualized portions of  paranasal sinuses, and mastoid air cells are relatively clear. The  orbits are grossly unremarkable.                                                                      Impression: Essentially normal brain MRI      I have personally reviewed the examination and initial interpretation  and I agree with the findings.     EMBER CARVAJAL MD    Past Medical History reviewed   Social History     Tobacco Use     Smoking status: Not on file   Substance Use Topics     Alcohol use: Not on file    reviewed and verified with the patient     Allergies   Allergen Reactions     Peanut-Derived      Other reaction(s): Hives  One episode of hives eating peanuts out of shell as a child.      Seasonal Allergies        Current Outpatient Medications   Medication Sig Dispense Refill     amitriptyline (ELAVIL) 10 MG tablet Take 2 tablets (20 mg) by mouth At Bedtime 60 tablet 6     aspirin-acetaminophen-caffeine (EXCEDRIN MIGRAINE) 250-250-65 MG tablet Take 2 tablets by mouth every 6 hours as needed for headaches       ondansetron (ZOFRAN-ODT) 4 MG ODT tab Take 1 tablet (4 mg) by mouth every 8 hours as needed for nausea 20 tablet 3     rizatriptan (MAXALT-MLT) 5 MG ODT Take 1-2 tablets (5-10 mg) by mouth at onset of headache for migraine (may repeat in 2 hours as needed. Max 30 mg in 24 hours) 18 tablet 6   reviewed and verified with the patient    Review of Systems:   A 10-point ROS including constitutional, eyes, respiratory, cardiovascular, gastroenterology, genitourinary, integumentary, musculoskeletal, neurology and psychiatric were all negative except as mentioned in the interval  history.     General Exam:   /85   Pulse 73   Resp 16   SpO2 98%   GEN: Awake, NAD; good eye contact, responses appropriately , healthy apearingSpeech is fluent without dysarthria. Mentation appears normal, affect normal/bright, judgement and insight intact, normal speech and appearance well-groomed.HEENT: Head atraumatic/Normocephalic. Scalp normal. Pupils equally round, 4 mm, reactive to light and accommodation, sclera and conjunctiva normal. Fundoscopic examination reveals normal vessels no papilledema.  EOM intact. There is no nystagmus. Has conjugated gaze. Face is symmetrical. Intact and symmetrical eyebrow and lid raise and eyelid closure, smiles. Hearing Intact to conversation speech. The palates elevates symmetrical. The tongue protrudes midline with no atrophy or fasciculations. Strength  5/5 in the upper and lower extremities bilaterally. Sensation is intact to  touch throughout.  Reflexes symmetrical at biceps, triceps, brachioradialis, patellar, and Achilles.Coordination reveals finger-nose-finger with normal speed and accuracy. Normal casual gait.    Assessment and Plan:  See interval history for our discussion and plan     Prescription for propranolol provided. Correct use and course provided. Expected benefits and typical side effects reviewed. Safety of concomitant medications and interactions reviewed. Patient taught signs and symptoms of adverse reactions and allergies. Patient understands teaching and accepts risks of prescribed medication regimen.    I discussed all my recommendation with Dustin Rainey. The patient verbalizes understanding and comfortable with the plan. The patient has our clinic phone number to call with any questions or concerns. All of the patient's questions were answered from the best of my current knowledge.     Time spent with pt answering questions, discussing findings, counseling and coordinating care was more than 50% the appointment time, 26 minutes.          KEVIN Shaw, CNP  [facility] Neurology [facility]      Again, thank you for allowing me to participate in the care of your patient.      Sincerely,    KEVIN Christianson CNP

## 2020-08-26 NOTE — PROGRESS NOTES
Re: Dustin Rainey      MRN# [id number]  YOB: 1985  Date of Visit: 8/26/2020    OUTPATIENT NEUROLOGY VISIT NOTE    Reason for Visit:  Headache follow-up    Interval History:  Dustin Rainey is a 35-year-old male presents to the [facility] headache clinic today for headache follow-up.  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  Headaches persistent for the past couple of years.  Family history of headaches grandmother.  Headaches daily with frequency 30 a.m. days out of 30 days/months with 1-2 per month severe migraine with duration 6 to 8 hours.  Associated with features of lightheadedness, nausea.  Daily headaches typical renal late morning or afternoon.  Headaches location is usually on the right temple and back of the head.  Daily headaches rates 2-5/10 on the numeric pain scale and duration several hours.  Patient takes Advil or Excedrin frequently and rizatriptan as needed which helps with most severe migraine headaches.  Patient was started amitriptyline in March and did try to increase amitriptyline to 30 mg at bedtime but did not tolerate it.  Patient is currently takes amitriptyline 20 mg at bedtime.  Patient did try nortriptyline in the past and it did not work.  Patient had his brain MRI with and without contrast completed today and we reviewed his brain MRI images and no acute findings at this time to explain patient's persistent headaches.   We discussed frequent acute analgesics use and possible rebound headaches.  Patient was advised to use acute analgesics with total use no more than 14days/months.  Patient was asked to wean off ibuprofen use temporarily and reduce his Excedrin use.  We discussed changes in his preventive treatment and a trial of propranolol.  Patient denies any history of heart disease or any other  pertinent medical history.  No apparent contraindications to try  propranolol.  No history of severe depression reported.  Propranolol side effects reviewed with the patient.  We discussed use of CGRP's or Botox injections in addition to his current treatment in the future if no response to propranolol.    Headache treatment plan discussed with the patient:  Headache prevention  Exercise 3 days per week for 30 minutes at least   Vitamin D 800 units OTC daily   Vitamin B2 (riboflavin) 200-400 mg daily OTC  Wean off amitriptyline once stable on propranolol   A trial of propranolol 60 mg ER at bedtime Side effects-fatigue, drowsiness, exercise intolerance, cold feet or hands, dizziness and low blood pressure or heart rate. Stop using is any intolerance or chest pain, low heart rate, significant fatigue, shortness of breath or worsening depression.   Wean off ibuprofen/advil use and stop it  Wean off Excedrin use to no more than 8-10 days per month or stop it  May take naproxen (Aleve) 1-2 tabs every 12 hours as needed. Limit use to no more than 14 days per month  Rizatriptan as needed but limit use to no more than 9 days per month  Follow up in 4-6 weeks via video or sooner if needed    Neurodiagnostic Testing   MR BRAIN W/O & W CONTRAST 8/26/2020 12:57 PM     Provided History: worsening headache; Worsening headaches.  ICD-10: Worsening headaches     Comparison: None.     Technique: Multiplanar T1-weighted, axial FLAIR, and susceptibility  images were obtained without intravenous contrast. Following  intravenous gadolinium-based contrast administration, axial  T2-weighted, diffusion, and T1-weighted images (in multiple planes)  were obtained.     Contrast: 7.5 mL Gadavist      Findings:  There is no mass effect, midline shift, or evidence of intracranial  hemorrhage. The ventricles are proportionate to the cerebral sulci.  Normal major vascular intracranial flow-voids. Small right  temporo-occipital DVA.     Postcontrast images demonstrate no abnormal intracranial enhancement.     No  abnormality of the skull marrow signal. The visualized portions of  paranasal sinuses, and mastoid air cells are relatively clear. The  orbits are grossly unremarkable.                                                                      Impression: Essentially normal brain MRI      I have personally reviewed the examination and initial interpretation  and I agree with the findings.     EMBER CARVAJAL MD    Past Medical History reviewed   Social History     Tobacco Use     Smoking status: Not on file   Substance Use Topics     Alcohol use: Not on file    reviewed and verified with the patient     Allergies   Allergen Reactions     Peanut-Derived      Other reaction(s): Hives  One episode of hives eating peanuts out of shell as a child.      Seasonal Allergies        Current Outpatient Medications   Medication Sig Dispense Refill     amitriptyline (ELAVIL) 10 MG tablet Take 2 tablets (20 mg) by mouth At Bedtime 60 tablet 6     aspirin-acetaminophen-caffeine (EXCEDRIN MIGRAINE) 250-250-65 MG tablet Take 2 tablets by mouth every 6 hours as needed for headaches       ondansetron (ZOFRAN-ODT) 4 MG ODT tab Take 1 tablet (4 mg) by mouth every 8 hours as needed for nausea 20 tablet 3     rizatriptan (MAXALT-MLT) 5 MG ODT Take 1-2 tablets (5-10 mg) by mouth at onset of headache for migraine (may repeat in 2 hours as needed. Max 30 mg in 24 hours) 18 tablet 6   reviewed and verified with the patient    Review of Systems:   A 10-point ROS including constitutional, eyes, respiratory, cardiovascular, gastroenterology, genitourinary, integumentary, musculoskeletal, neurology and psychiatric were all negative except as mentioned in the interval history.     General Exam:   /85   Pulse 73   Resp 16   SpO2 98%   GEN: Awake, NAD; good eye contact, responses appropriately , healthy apearingSpeech is fluent without dysarthria. Mentation appears normal, affect normal/bright, judgement and insight intact, normal speech and  appearance well-groomed.HEENT: Head atraumatic/Normocephalic. Scalp normal. Pupils equally round, 4 mm, reactive to light and accommodation, sclera and conjunctiva normal. Fundoscopic examination reveals normal vessels no papilledema.  EOM intact. There is no nystagmus. Has conjugated gaze. Face is symmetrical. Intact and symmetrical eyebrow and lid raise and eyelid closure, smiles. Hearing Intact to conversation speech. The palates elevates symmetrical. The tongue protrudes midline with no atrophy or fasciculations. Strength  5/5 in the upper and lower extremities bilaterally. Sensation is intact to  touch throughout.  Reflexes symmetrical at biceps, triceps, brachioradialis, patellar, and Achilles.Coordination reveals finger-nose-finger with normal speed and accuracy. Normal casual gait.    Assessment and Plan:  See interval history for our discussion and plan     Prescription for propranolol provided. Correct use and course provided. Expected benefits and typical side effects reviewed. Safety of concomitant medications and interactions reviewed. Patient taught signs and symptoms of adverse reactions and allergies. Patient understands teaching and accepts risks of prescribed medication regimen.    I discussed all my recommendation with Dustin Rainey. The patient verbalizes understanding and comfortable with the plan. The patient has our clinic phone number to call with any questions or concerns. All of the patient's questions were answered from the best of my current knowledge.     Time spent with pt answering questions, discussing findings, counseling and coordinating care was more than 50% the appointment time, 26 minutes.         KEVIN Shaw, CNP  [facility] Neurology Clinic

## 2020-08-26 NOTE — NURSING NOTE
Chief Complaint   Patient presents with     Follow Up     [facility] HEADACHE        Dennis Walter

## 2020-08-26 NOTE — LETTER
8/26/2020       RE: Dustin Rainey  [address]   [address]  [place] [address]     Dear Colleague,    Thank you for referring your patient, Dustin Rainey, to the [facility] NEUROLOGY at [facility]. Please see a copy of my visit note below.    Re: Dustin Rainey      MRN# [id number]  YOB: 1985  Date of Visit: 8/26/2020    OUTPATIENT NEUROLOGY VISIT NOTE    Reason for Visit:  Headache follow-up    Interval History:  Dustin Rainey is a 35-year-old male presents to the [facility] headache clinic today for headache follow-up.  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  Headaches persistent for the past couple of years.  Family history of headaches grandmother.  Headaches daily with frequency 30 a.m. days out of 30 days/months with 1-2 per month severe migraine with duration 6 to 8 hours.  Associated with features of lightheadedness, nausea.  Daily headaches typical renal late morning or afternoon.  Headaches location is usually on the right temple and back of the head.  Daily headaches rates 2-5/10 on the numeric pain scale and duration several hours.  Patient takes Advil or Excedrin frequently and rizatriptan as needed which helps with most severe migraine headaches.  Patient was started amitriptyline in March and did try to increase amitriptyline to 30 mg at bedtime but did not tolerate it.  Patient is currently takes amitriptyline 20 mg at bedtime.  Patient did try nortriptyline in the past and it did not work.  Patient had his brain MRI with and without contrast completed today and we reviewed his brain MRI images and no acute findings at this time to explain patient's persistent headaches.   We discussed frequent acute analgesics use and possible rebound headaches.  Patient was advised to use acute analgesics with total use no more than 14days/months.   Patient was asked to wean off ibuprofen use temporarily and reduce his Excedrin use.  We discussed changes in his preventive treatment and a trial of propranolol.  Patient denies any history of heart disease or any other M pertinent medical history.  No apparent contraindications to try propranolol.  No history of severe depression reported.  Propranolol side effects reviewed with the patient.  We discussed use of CGRP's or Botox injections in addition to his current treatment in the future if no response to propranolol.    Headache treatment plan discussed with the patient:  Headache prevention  Exercise 3 days per week for 30 minutes at least   Vitamin D 800 units OTC daily   Vitamin B2 (riboflavin) 200-400 mg daily OTC  Wean off amitriptyline once stable on propranolol   A trial of propranolol 60 mg ER at bedtime Side effects-fatigue, drowsiness, exercise intolerance, cold feet or hands, dizziness and low blood pressure or heart rate. Stop using is any intolerance or chest pain, low heart rate, significant fatigue, shortness of breath or worsening depression.   Wean off ibuprofen/advil use and stop it  Wean off Excedrin use to no more than 8-10 days per month or stop it  May take naproxen (Aleve) 1-2 tabs every 12 hours as needed. Limit use to no more than 14 days per month  Rizatriptan as needed but limit use to no more than 9 days per month  Follow up in 4-6 weeks via video or sooner if needed    Neurodiagnostic Testing   MR BRAIN W/O & W CONTRAST 8/26/2020 12:57 PM     Provided History: worsening headache; Worsening headaches.  ICD-10: Worsening headaches     Comparison: None.     Technique: Multiplanar T1-weighted, axial FLAIR, and susceptibility  images were obtained without intravenous contrast. Following  intravenous gadolinium-based contrast administration, axial  T2-weighted, diffusion, and T1-weighted images (in multiple planes)  were obtained.     Contrast: 7.5 mL Gadavist      Findings:  There is no  mass effect, midline shift, or evidence of intracranial  hemorrhage. The ventricles are proportionate to the cerebral sulci.  Normal major vascular intracranial flow-voids. Small right  temporo-occipital DVA.     Postcontrast images demonstrate no abnormal intracranial enhancement.     No abnormality of the skull marrow signal. The visualized portions of  paranasal sinuses, and mastoid air cells are relatively clear. The  orbits are grossly unremarkable.                                                                      Impression: Essentially normal brain MRI      I have personally reviewed the examination and initial interpretation  and I agree with the findings.     EMBER CARVAJAL MD    Past Medical History reviewed   Social History     Tobacco Use     Smoking status: Not on file   Substance Use Topics     Alcohol use: Not on file    reviewed and verified with the patient     Allergies   Allergen Reactions     Peanut-Derived      Other reaction(s): Hives  One episode of hives eating peanuts out of shell as a child.      Seasonal Allergies        Current Outpatient Medications   Medication Sig Dispense Refill     amitriptyline (ELAVIL) 10 MG tablet Take 2 tablets (20 mg) by mouth At Bedtime 60 tablet 6     aspirin-acetaminophen-caffeine (EXCEDRIN MIGRAINE) 250-250-65 MG tablet Take 2 tablets by mouth every 6 hours as needed for headaches       ondansetron (ZOFRAN-ODT) 4 MG ODT tab Take 1 tablet (4 mg) by mouth every 8 hours as needed for nausea 20 tablet 3     rizatriptan (MAXALT-MLT) 5 MG ODT Take 1-2 tablets (5-10 mg) by mouth at onset of headache for migraine (may repeat in 2 hours as needed. Max 30 mg in 24 hours) 18 tablet 6   reviewed and verified with the patient    Review of Systems:   A 10-point ROS including constitutional, eyes, respiratory, cardiovascular, gastroenterology, genitourinary, integumentary, musculoskeletal, neurology and psychiatric were all negative except as mentioned in the interval  history.     General Exam:   /85   Pulse 73   Resp 16   SpO2 98%   GEN: Awake, NAD; good eye contact, responses appropriately , healthy apearingSpeech is fluent without dysarthria. Mentation appears normal, affect normal/bright, judgement and insight intact, normal speech and appearance well-groomed.HEENT: Head atraumatic/Normocephalic. Scalp normal. Pupils equally round, 4 mm, reactive to light and accommodation, sclera and conjunctiva normal. Fundoscopic examination reveals normal vessels no papilledema.  EOM intact. There is no nystagmus. Has conjugated gaze. Face is symmetrical. Intact and symmetrical eyebrow and lid raise and eyelid closure, smiles. Hearing Intact to conversation speech. The palates elevates symmetrical. The tongue protrudes midline with no atrophy or fasciculations. Strength  5/5 in the upper and lower extremities bilaterally. Sensation is intact to  touch throughout.  Reflexes symmetrical at biceps, triceps, brachioradialis, patellar, and Achilles.Coordination reveals finger-nose-finger with normal speed and accuracy. Normal casual gait.    Assessment and Plan:  See interval history for our discussion and plan     Prescription for propranolol provided. Correct use and course provided. Expected benefits and typical side effects reviewed. Safety of concomitant medications and interactions reviewed. Patient taught signs and symptoms of adverse reactions and allergies. Patient understands teaching and accepts risks of prescribed medication regimen.    I discussed all my recommendation with Dustin Rainey. The patient verbalizes understanding and comfortable with the plan. The patient has our clinic phone number to call with any questions or concerns. All of the patient's questions were answered from the best of my current knowledge.     Time spent with pt answering questions, discussing findings, counseling and coordinating care was more than 50% the appointment time, 26 minutes.      Again, thank you for allowing me to participate in the care of your patient.  Sincerely,    KEVIN Shaw, CNP  [facility] Neurology Clinic

## 2020-08-26 NOTE — DISCHARGE INSTRUCTIONS
MRI Contrast Discharge Instructions    The IV contrast you received today will pass out of your body in your  urine. This will happen in the next 24 hours. You will not feel this process.  Your urine will not change color.    Drink at least 4 extra glasses of water or juice today (unless your doctor  has restricted your fluids). This reduces the stress on your kidneys.  You may take your regular medicines.    If you are on dialysis: It is best to have dialysis today.    If you have a reaction: Most reactions happen right away. If you have  any new symptoms after leaving the hospital (such as hives or swelling),  call your hospital at the correct number below. Or call your family doctor.  If you have breathing distress or wheezing, call 911.    Special instructions: ***    I have read and understand the above information.    Signature:______________________________________ Date:___________    Staff:__________________________________________ Date:___________     Time:__________    [place] [facility] Departments:    ___[place]: [phone]  ___[facility]: [phone]  ___[place]: [phone] ___[facility]: [phone]  ___[facility]: [phone]  ___[facility]: [phone]  ___[phone]  ___[facility]: [phone]  ___[phone]

## 2020-08-26 NOTE — PATIENT INSTRUCTIONS
Plan:  Headache prevention  Exercise 3 days per week for 30 minutes at least   Vitamin D 800 units OTC daily   Vitamin B2 (riboflavin) 200-400 mg daily OTC  Wean off amitriptyline once stable on propranolol   A trial of propranolol 60 mg ER at bedtime Side effects-fatigue, drowsiness, exercise intolerance, cold feet or hands, dizziness and low blood pressure or heart rate. Stop using is any intolerance or chest pain, low heart rate, significant fatigue, shortness of breath or worsening depression.   Wean off ibuprofen/advil use and stop it  Wean off Excedrin use to no more than 8-10 days per month or stop it  May take naproxen (Aleve) 1-2 tabs every 12 hours as needed. Limit use to no more than 14 days per month  Rizatriptan as needed but limit use to no more than 9 days per month  Follow up in 4-6 weeks via video or sooner if needed          Patient Education     Propranolol extended-release capsules  Brand Names: Inderal LA, Inderal XL, InnoPran XL  What is this medicine?  PROPRANOLOL (proe PRAN oh lole) is a beta-blocker. Beta-blockers reduce the workload on the heart and help it to beat more regularly. This medicine is used to treat high blood pressure, heart muscle disease, and prevent chest pain caused by angina. It is also used to prevent migraine headaches. You should not use this medicine to treat a migraine that has already started.  How should I use this medicine?  Take this medicine by mouth with a glass of water. Follow the directions on the prescription label. Do not crush or chew. Take your doses at regular intervals. Do not take your medicine more often than directed. Do not stop taking except on the advice of your doctor or health care professional.  Talk to your pediatrician regarding the use of this medicine in children. Special care may be needed.  What side effects may I notice from receiving this medicine?  Side effects that you should report to your doctor or health care professional as soon  as possible:    allergic reactions like skin rash, itching or hives, swelling of the face, lips, or tongue    breathing problems    changes in blood sugar    cold hands or feet    difficulty sleeping, nightmares    dry peeling skin    hallucinations    muscle cramps or weakness    slow heart rate    swelling of the legs and ankles    vomiting  Side effects that usually do not require medical attention (report to your doctor or health care professional if they continue or are bothersome):    change in sex drive or performance    diarrhea    dry sore eyes    hair loss    nausea    weak or tired  What may interact with this medicine?  Do not take this medicine with any of the following medications:    feverfew    phenothiazines like chlorpromazine, mesoridazine, prochlorperazine, thioridazine  This medicine may also interact with the following medications:    aluminum hydroxide gel    antipyrine    antiviral medicines for HIV or AIDS    barbiturates like phenobarbital    certain medicines for blood pressure, heart disease, irregular heart beat    cimetidine    ciprofloxacin    diazepam    fluconazole    haloperidol    isoniazid    medicines for cholesterol like cholestyramine or colestipol    medicines for mental depression    medicines for migraine headache like almotriptan, eletriptan, frovatriptan, naratriptan, rizatriptan, sumatriptan, zolmitriptan    NSAIDs, medicines for pain and inflammation, like ibuprofen or naproxen    phenytoin    rifampin    teniposide    theophylline    thyroid medicines    tolbutamide    warfarin    zileuton  What if I miss a dose?  If you miss a dose, take it as soon as you can. If it is almost time for your next dose, take only that dose. Do not take double or extra doses.  Where should I keep my medicine?  Keep out of the reach of children.  Store at room temperature between 15 and 30 degrees C (59 and 86 degrees F). Protect from light, moisture and freezing. Keep container tightly  closed. Throw away any unused medicine after the expiration date.  What should I tell my health care provider before I take this medicine?  They need to know if you have any of these conditions:    circulation problems, or blood vessel disease    diabetes    history of heart attack or heart disease, vasospastic angina    kidney disease    liver disease    lung or breathing disease, like asthma or emphysema    pheochromocytoma    slow heart rate    thyroid disease    an unusual or allergic reaction to propranolol, other beta-blockers, medicines, foods, dyes, or preservatives    pregnant or trying to get pregnant    breast-feeding  What should I watch for while using this medicine?  Visit your doctor or health care professional for regular check ups. Contact your doctor right away if your symptoms worsen. Check your blood pressure and pulse rate regularly. Ask your health care professional what your blood pressure and pulse rate should be, and when you should contact them.  Do not stop taking this medicine suddenly. This could lead to serious heart-related effects.  You may get drowsy or dizzy. Do not drive, use machinery, or do anything that needs mental alertness until you know how this drug affects you. Do not stand or sit up quickly, especially if you are an older patient. This reduces the risk of dizzy or fainting spells. Alcohol can make you more drowsy and dizzy. Avoid alcoholic drinks.  This medicine can affect blood sugar levels. If you have diabetes, check with your doctor or health care professional before you change your diet or the dose of your diabetic medicine.  Do not treat yourself for coughs, colds, or pain while you are taking this medicine without asking your doctor or health care professional for advice. Some ingredients may increase your blood pressure.  NOTE:This sheet is a summary. It may not cover all possible information. If you have questions about this medicine, talk to your doctor,  pharmacist, or health care provider. Copyright  2019 [employer]

## 2020-10-01 ENCOUNTER — VIRTUAL VISIT (OUTPATIENT)
Dept: NEUROLOGY | Facility: CLINIC | Age: 35
End: 2020-10-01
Payer: COMMERCIAL

## 2020-10-01 DIAGNOSIS — G43.719 INTRACTABLE CHRONIC MIGRAINE WITHOUT AURA AND WITHOUT STATUS MIGRAINOSUS: Primary | ICD-10-CM

## 2020-10-01 PROCEDURE — 99214 OFFICE O/P EST MOD 30 MIN: CPT | Mod: 95 | Performed by: NURSE PRACTITIONER

## 2020-10-01 RX ORDER — TOPIRAMATE 25 MG/1
TABLET, FILM COATED ORAL
Qty: 60 TABLET | Refills: 3 | Status: SHIPPED | OUTPATIENT
Start: 2020-10-01 | End: 2022-07-22

## 2020-10-01 NOTE — LETTER
"10/1/2020       RE: Dustin Rainey  [address]  [address]  [place] [address]     Dear Colleague,    Thank you for referring your patient, Dustin Rainey, to the [facility] NEUROLOGY [facility] at [facility]. Please see a copy of my visit note below.    Dustin Rainey is a 35 year old male who is being evaluated via a billable video visit.      The patient has been notified of following:     \"This video visit will be conducted via a call between you and your physician/provider. We have found that certain health care needs can be provided without the need for an in-person physical exam.  This service lets us provide the care you need with a video conversation.  If a prescription is necessary we can send it directly to your pharmacy.  If lab work is needed we can place an order for that and you can then stop by our lab to have the test done at a later time.    Video visits are billed at different rates depending on your insurance coverage.  Please reach out to your insurance provider with any questions.    If during the course of the call the physician/provider feels a video visit is not appropriate, you will not be charged for this service.\"    Patient has given verbal consent for Video visit? yes  How would you like to obtain your AVS? mychart  If you are dropped from the video visit, the video invite should be resent to: cell  Will anyone else be joining your video visit? no        Video-Visit Details    Type of service:  Video Visit    Video Start Time: 2:46 PM  Video End Time: 3:13 PM    Originating Location (pt. Location): Home    Distant Location (provider location):  [facility] NEUROLOGY [facility]     Platform used for Video Visit: TRAVIS Johnson    CC:  Headache follow-up. This visit was conducted via synchronous video visit due to the current COVID-19 crisis to reduce patient risk.  Verbal consent was " obtained.     Interval History:  This is a 35-year old male who present to [facility] Headache virtual Clinic for headache follow up. Initial Headache evaluation on 3/11/2020, see note for details.     Today Patient reports that propranolol does not help with the headaches.   Amitriptyline   Headaches are still daily and really intense headaches may be 1-2 times per month and one recent due to lack of sleep.   Daily headaches are 5/10 and reports that not severe but a dull pain in the head and worse if he does not take anything.  Patient takes Excedrin.   Reports that Aleve seems to work in the beginning but headaches would come back.   Headaches tend to be on either side and migraine headaches toward the back of the eye on the left side   Headache days daily 30 days out of 30 days and duration all day without     Headache treatment :  Amitriptyline caused tiredness feeling in the higher dose and did not help with headache  Nortriptyline not help  Propranolol -no relief  Rizatriptan  topiramate    The same headache for at least several years.     Headache treatment Plan :  Stop propranolol   Start topiramate  Migraine headache prevention-a trial of topiramate 25 mg at bedtime for one week, then 50 mg at bedtime for one week, then take 75 mg (3 tabs) at bedtime. Side effects-kidney stones-stay hydrated and drink at least 10+glasses of water to decrease risk of kidney stones, may cause tingling in the hands and feet, taste changes, glaucoma, nausea, weight stable or loss, mood changes or depression.    Recommended Botox and will send for PA.   Acute migraine headache treatment -rizatriptan as needed  Follow up after first round of Botox    PMH, allergies and current prescription medications reviewed    10 point ROS of systems including Constitutional, Eyes, Respiratory, Cardiovascular, Gastroenterology, Genitourinary, Integumentary, Muscularskeletal, Psychiatric were reviewed and no concerns reported today unless as  mentioned in the interval history    Patient appears  alert and no in apparent acute distress,  mentation appears normal, judgement and insight intact, normal speech, CN grossly intact, no apparent weakness, speech intact    A/P  See Interval History for our discussion and plan    I discussed all my recommendations with Dustin Rainey who verbalizes understanding and comfortable with the plan.  All of patient's questions were answered from the best of my knowledge.  Patient is in agreement with the plan.     I spent a total of 26 minutes for telemedicine consult with the patient during today s virtual meeting. Over 50% of this time was spent counseling the patient and/or coordinating care    KEVIN Leal CNP  [facility]                                Again, thank you for allowing me to participate in the care of your patient.      Sincerely,    KEVIN Christianson CNP

## 2020-10-01 NOTE — LETTER
10/1/2020       RE: Dustin Rainey  [address]   [address]  [place] [address]     Dear Colleague,    Thank you for referring your patient, Dustin Rainey, to the [facility]UROLOG[facility] [place] [facility]. Please see a copy of my visit note below.    CC:  Headache follow-up. This visit was conducted via synchronous video visit due to the current COVID-19 crisis to reduce patient risk.  Verbal consent was obtained.     Interval History:  This is a 35-year old male who present to [facility] Headache virtual Clinic for headache follow up. Initial Headache evaluation on 3/11/2020, see note for details.     Today Patient reports that propranolol does not help with the headaches.   Amitriptyline   Headaches are still daily and really intense headaches may be 1-2 times per month and one recent due to lack of sleep.   Daily headaches are 5/10 and reports that not severe but a dull pain in the head and worse if he does not take anything.  Patient takes Excedrin.   Reports that Aleve seems to work in the beginning but headaches would come back.   Headaches tend to be on either side and migraine headaches toward the back of the eye on the left side   Headache days daily 30 days out of 30 days and duration all day without     Headache treatment :  Amitriptyline caused tiredness feeling in the higher dose and did not help with headache  Nortriptyline not help  Propranolol -no relief  Rizatriptan  topiramate    The same headache for at least several years.     Headache treatment Plan :  Stop propranolol   Start topiramate  Migraine headache prevention-a trial of topiramate 25 mg at bedtime for one week, then 50 mg at bedtime for one week, then take 75 mg (3 tabs) at bedtime. Side effects-kidney stones-stay hydrated and drink at least 10+glasses of water to decrease risk of kidney stones, may cause tingling in the hands and feet, taste changes, glaucoma, nausea,  weight stable or loss, mood changes or depression.    Recommended Botox and will send for PA.   Acute migraine headache treatment -rizatriptan as needed  Follow up after first round of Botox    PMH, allergies and current prescription medications reviewed    10 point ROS of systems including Constitutional, Eyes, Respiratory, Cardiovascular, Gastroenterology, Genitourinary, Integumentary, Muscularskeletal, Psychiatric were reviewed and no concerns reported today unless as mentioned in the interval history    Patient appears  alert and no in apparent acute distress,  mentation appears normal, judgement and insight intact, normal speech, CN grossly intact, no apparent weakness, speech intact    A/P  See Interval History for our discussion and plan    I discussed all my recommendations with Dustin Rainey who verbalizes understanding and comfortable with the plan.  All of patient's questions were answered from the best of my knowledge.  Patient is in agreement with the plan.     I spent a total of 26 minutes for telemedicine consult with the patient during today s virtual meeting. Over 50% of this time was spent counseling the patient and/or coordinating care      Again, thank you for allowing me to participate in the care of your patient.  Sincerely,    KEVIN Leal [facility]

## 2020-10-01 NOTE — PROGRESS NOTES
"Dustin Rainey is a 35 year old male who is being evaluated via a billable video visit.      The patient has been notified of following:     \"This video visit will be conducted via a call between you and your physician/provider. We have found that certain health care needs can be provided without the need for an in-person physical exam.  This service lets us provide the care you need with a video conversation.  If a prescription is necessary we can send it directly to your pharmacy.  If lab work is needed we can place an order for that and you can then stop by our lab to have the test done at a later time.    Video visits are billed at different rates depending on your insurance coverage.  Please reach out to your insurance provider with any questions.    If during the course of the call the physician/provider feels a video visit is not appropriate, you will not be charged for this service.\"    Patient has given verbal consent for Video visit? yes  How would you like to obtain your AVS? mychart  If you are dropped from the video visit, the video invite should be resent to: cell  Will anyone else be joining your video visit? no        Video-Visit Details    Type of service:  Video Visit    Video Start Time: 2:46 PM  Video End Time: 3:13 PM    Originating Location (pt. Location): Home    Distant Location (provider location):  [facility] NEUROLOGY [facility]     Platform used for Video Visit: Sindy Rainey, EMT    CC:  Headache follow-up. This visit was conducted via synchronous video visit due to the current COVID-19 crisis to reduce patient risk.  Verbal consent was obtained.     Interval History:  This is a 35-year old male who present to [facility] Headache virtual Clinic for headache follow up. Initial Headache evaluation on 3/11/2020, see note for details.     Today Patient reports that propranolol does not help with the headaches.   Amitriptyline   Headaches are still daily and really " intense headaches may be 1-2 times per month and one recent due to lack of sleep.   Daily headaches are 5/10 and reports that not severe but a dull pain in the head and worse if he does not take anything.  Patient takes Excedrin.   Reports that Aleve seems to work in the beginning but headaches would come back.   Headaches tend to be on either side and migraine headaches toward the back of the eye on the left side   Headache days daily 30 days out of 30 days and duration all day without     Headache treatment :  Amitriptyline caused tiredness feeling in the higher dose and did not help with headache  Nortriptyline not help  Propranolol -no relief  Rizatriptan  topiramate    The same headache for at least several years.     Headache treatment Plan :  Stop propranolol   Start topiramate  Migraine headache prevention-a trial of topiramate 25 mg at bedtime for one week, then 50 mg at bedtime for one week, then take 75 mg (3 tabs) at bedtime. Side effects-kidney stones-stay hydrated and drink at least 10+glasses of water to decrease risk of kidney stones, may cause tingling in the hands and feet, taste changes, glaucoma, nausea, weight stable or loss, mood changes or depression.    Recommended Botox and will send for PA.   Acute migraine headache treatment -rizatriptan as needed  Follow up after first round of Botox    PMH, allergies and current prescription medications reviewed    10 point ROS of systems including Constitutional, Eyes, Respiratory, Cardiovascular, Gastroenterology, Genitourinary, Integumentary, Muscularskeletal, Psychiatric were reviewed and no concerns reported today unless as mentioned in the interval history    Patient appears  alert and no in apparent acute distress,  mentation appears normal, judgement and insight intact, normal speech, CN grossly intact, no apparent weakness, speech intact    A/P  See Interval History for our discussion and plan    I discussed all my recommendations with Dustin  Redd who verbalizes understanding and comfortable with the plan.  All of patient's questions were answered from the best of my knowledge.  Patient is in agreement with the plan.     I spent a total of 26 minutes for telemedicine consult with the patient during today s virtual meeting. Over 50% of this time was spent counseling the patient and/or coordinating care    KEVIN Leal [facility]

## 2020-10-21 DIAGNOSIS — G43.011 INTRACTABLE MIGRAINE WITHOUT AURA AND WITH STATUS MIGRAINOSUS: ICD-10-CM

## 2020-10-21 RX ORDER — RIZATRIPTAN BENZOATE 5 MG/1
TABLET, ORALLY DISINTEGRATING ORAL
Qty: 18 TABLET | Refills: 6 | Status: SHIPPED | OUTPATIENT
Start: 2020-10-21 | End: 2021-04-22

## 2021-01-04 ENCOUNTER — HEALTH MAINTENANCE LETTER (OUTPATIENT)
Age: 36
End: 2021-01-04

## 2021-04-22 DIAGNOSIS — G43.009 MIGRAINE WITHOUT AURA AND WITHOUT STATUS MIGRAINOSUS, NOT INTRACTABLE: ICD-10-CM

## 2021-04-22 DIAGNOSIS — G43.011 INTRACTABLE MIGRAINE WITHOUT AURA AND WITH STATUS MIGRAINOSUS: ICD-10-CM

## 2021-04-22 RX ORDER — ONDANSETRON 4 MG/1
4 TABLET, ORALLY DISINTEGRATING ORAL EVERY 8 HOURS PRN
Qty: 20 TABLET | Refills: 5 | Status: SHIPPED | OUTPATIENT
Start: 2021-04-22 | End: 2022-07-22

## 2021-04-22 RX ORDER — RIZATRIPTAN BENZOATE 5 MG/1
5-10 TABLET, ORALLY DISINTEGRATING ORAL
Qty: 18 TABLET | Refills: 9 | Status: SHIPPED | OUTPATIENT
Start: 2021-04-22 | End: 2022-01-25

## 2021-04-25 ENCOUNTER — HEALTH MAINTENANCE LETTER (OUTPATIENT)
Age: 36
End: 2021-04-25

## 2021-10-10 ENCOUNTER — HEALTH MAINTENANCE LETTER (OUTPATIENT)
Age: 36
End: 2021-10-10

## 2022-01-25 DIAGNOSIS — G43.009 MIGRAINE WITHOUT AURA AND WITHOUT STATUS MIGRAINOSUS, NOT INTRACTABLE: ICD-10-CM

## 2022-01-25 RX ORDER — RIZATRIPTAN BENZOATE 5 MG/1
5-10 TABLET, ORALLY DISINTEGRATING ORAL
Qty: 18 TABLET | Refills: 9 | Status: SHIPPED | OUTPATIENT
Start: 2022-01-25 | End: 2022-11-11

## 2022-05-22 ENCOUNTER — HEALTH MAINTENANCE LETTER (OUTPATIENT)
Age: 37
End: 2022-05-22

## 2022-07-20 ASSESSMENT — HEADACHE IMPACT TEST (HIT 6)
HOW OFTEN HAVE YOU FELT TOO TIRED TO WORK BECAUSE OF YOUR HEADACHES: SOMETIMES
HOW OFTEN DID HEADACHS LIMIT CONCENTRATION ON WORK OR DAILY ACTIVITY: VERY OFTEN
HOW OFTEN DO HEADACHES LIMIT YOUR DAILY ACTIVITIES: VERY OFTEN
WHEN YOU HAVE A HEADACHE HOW OFTEN DO YOU WISH YOU COULD LIE DOWN: SOMETIMES
HOW OFTEN HAVE YOU FELT FED UP OR IRRITATED BECAUSE OF YOUR HEADACHES: VERY OFTEN
WHEN YOU HAVE HEADACHES HOW OFTEN IS THE PAIN SEVERE: SOMETIMES
HIT6 TOTAL SCORE: 63

## 2022-07-22 ENCOUNTER — VIRTUAL VISIT (OUTPATIENT)
Dept: NEUROLOGY | Facility: CLINIC | Age: 37
End: 2022-07-22
Payer: COMMERCIAL

## 2022-07-22 DIAGNOSIS — G43.009 MIGRAINE WITHOUT AURA AND WITHOUT STATUS MIGRAINOSUS, NOT INTRACTABLE: ICD-10-CM

## 2022-07-22 PROCEDURE — 99204 OFFICE O/P NEW MOD 45 MIN: CPT | Mod: 95 | Performed by: NURSE PRACTITIONER

## 2022-07-22 RX ORDER — ONDANSETRON 4 MG/1
4 TABLET, ORALLY DISINTEGRATING ORAL EVERY 8 HOURS PRN
Qty: 20 TABLET | Refills: 3 | Status: SHIPPED | OUTPATIENT
Start: 2022-07-22 | End: 2023-08-16

## 2022-07-22 ASSESSMENT — MIGRAINE DISABILITY ASSESSMENT (MIDAS)
HOW MANY DAYS WAS YOUR PRODUCTIVITY CUT IN HALF BECAUSE OF HEADACHES: 45
HOW MANY DAYS DID YOU NOT DO HOUSEWORK BECAUSE OF HEADACHES: 5
HOW OFTEN WERE SOCIAL ACTIVITIES MISSED DUE TO HEADACHES: 2
ON A SCALE FROM 0-10 ON AVERAGE HOW PAINFUL WERE HEADACHES: 6
TOTAL SCORE: 85
HOW MANY DAYS IN THE PAST 3 MONTHS HAVE YOU HAD A HEADACHE: 75
HOW MANY DAYS DID YOU MISS WORK OR SCHOOL BECAUSE OF HEADACHES: 3
HOW MANY DAYS WAS HOUSEWORK PRODUCTIVITY CUT IN HALF DUE TO HEADACHES: 30

## 2022-07-22 NOTE — PROGRESS NOTES
Dustin is a 37 year old who is being evaluated via a billable video visit.      How would you like to obtain your AVS? MyChart  If the video visit is dropped, the invitation should be resent by: Send to e-mail at: [email or web address]  Will anyone else be joining your video visit? No        Video-Visit Details    Video Start Time: 9:08 AM    Type of service:  Video Visit technical difficulties and telephone visit     Video End Time:9:50 AM    Originating Location (pt. Location): Home    Distant Location (provider location):  [facility] NEUROLOGY [facility]     Platform used for Video Visit: makexyz     [facility] visit follow up   Patient reports that some improvement in headaches and able to identified triggers. Eating and not skipping meals helps, getting exercise, 8-hours of sleep and staying hydration help reducing headaches.   Still experiencing headaches/migraines time to time. More often headaches develop mid afternoon -mild to moderate -sometimes takes advil in the afternoon and sometimes needs to take rizatriptan. There are days does not have headache and sometimes cluster of headaches 3-4 days -more intense than usual and subsides and back into a normal rhythm.   Sometimes gets nausea with migraine and takes it as needed, no side effects and useful  Takes Advil at least 20 days /month and rizatriptan 10-12 times per month and sometimes weeks when does not need rizatriptan at all. Headache free days 5-10 /month   Sleeps typically well    Reviewed treatment options-traditional, CGRPs, Botox -side effects reviewed   Gabapentin-reviewed side effects -dizziness, swelling, renal effect, mood changes, depression, SI, misuse. Reviewed side effects and patient would like to track his headaches precisely and would like to see if any changes can be made for at least a month.   Has Cefaly device -did not try as preventative   May add vit B2 200-400 mg daily OTC, magnesium, may consider    Rebound headaches prevention discussed with limiting OTC meds use.   Follow up via [employer] in 1-2 month after tracking headaches better and use of rescue treatment     Meds /treatment past reviewd  Propranolol -does not recall does not remember a trial and probably was not effective  Amitriptyline caused tiredness feeling in the higher dose and did not help with headache  Nortriptyline not help  Propranolol -no relief  Rizatriptan  topiramate    Patient is alert and no in apparent acute distress,  mentation appears normal, judgement and insight intact, normal speech.    DATA:  Brain MRI 8/26/2020 reviewed and reported as essentially normal per neuro-radiologist    I discussed all my recommendations with Dustin Rainey who verbalizes understanding and comfortable with the plan.  All of patient's questions were answered from the best of my knowledge.  Patient is in agreement with the plan.     42 minutes spent on the date of the encounter doing video access, chart  review, exam, results review,  meds review, treatment plan, documentation and further activities as noted above    KEVIN Shaw, CNP [facility]  Headache certified  [facility]

## 2022-07-22 NOTE — LETTER
Date:August 1, 2022      Provider requested that no letter be sent. Do not send.       [facility]

## 2022-07-22 NOTE — LETTER
7/22/2022       RE: Dustin Rainey  [address]  [facility] [address]     Dear Colleague,    Thank you for referring your patient, Dustin Rainey, to the [facility] NEUROLOGY [facility] [place] at [facility]. Please see a copy of my visit note below.    Dustin is a 37 year old who is being evaluated via a billable video visit.      How would you like to obtain your AVS? MyChart  If the video visit is dropped, the invitation should be resent by: Send to e-mail at: [email or web address]  Will anyone else be joining your video visit? No        Video-Visit Details    Video Start Time: 9:08 AM    Type of service:  Video Visit technical difficulties and telephone visit     Video End Time:9:50 AM    Originating Location (pt. Location): Home    Distant Location (provider location):  [facility]UROLOG[facility] [place]     Platform used for Video Visit: [email or web address] [facility] visit follow up   Patient reports that some improvement in headaches and able to identified triggers. Eating and not skipping meals helps, getting exercise, 8-hours of sleep and staying hydration help reducing headaches.   Still experiencing headaches/migraines time to time. More often headaches develop mid afternoon -mild to moderate -sometimes takes advil in the afternoon and sometimes needs to take rizatriptan. There are days does not have headache and sometimes cluster of headaches 3-4 days -more intense than usual and subsides and back into a normal rhythm.   Sometimes gets nausea with migraine and takes it as needed, no side effects and useful  Takes Advil at least 20 days /month and rizatriptan 10-12 times per month and sometimes weeks when does not need rizatriptan at all. Headache free days 5-10 /month   Sleeps typically well    Reviewed treatment options-traditional, CGRPs, Botox -side effects reviewed   Gabapentin-reviewed side effects -dizziness,  swelling, renal effect, mood changes, depression, SI, misuse. Reviewed side effects and patient would like to track his headaches precisely and would like to see if any changes can be made for at least a month.   Has Cefaly device -did not try as preventative   May add vit B2 200-400 mg daily OTC, magnesium, may consider   Rebound headaches prevention discussed with limiting OTC meds use.   [facility] 1[facility]2 [facility] after tracking headaches better and use of rescue treatment     Meds /treatment past reviewd  Propranolol -does not recall does not remember a trial and probably was not effective  Amitriptyline caused tiredness feeling in the higher dose and did not help with headache  Nortriptyline not help  Propranolol -no relief  Rizatriptan  topiramate    Patient is alert and no in apparent acute distress,  mentation appears normal, judgement and insight intact, normal speech.    DATA:  Brain MRI 8/26/2020 reviewed and reported as essentially normal per neuro-radiologist    I discussed all my recommendations with Dustin Rainey who verbalizes understanding and comfortable with the plan.  All of patient's questions were answered from the best of my knowledge.  Patient is in agreement with the plan.     42 minutes spent on the date of the encounter doing video access, chart  review, exam, results review,  meds review, treatment plan, documentation and further activities as noted above    KEVIN Shaw, CNP [facility]  Headache certified  [facility]                    Again, thank you for allowing me to participate in the care of your patient.      Sincerely,    KEVIN Christianson CNP

## 2022-09-18 ENCOUNTER — HEALTH MAINTENANCE LETTER (OUTPATIENT)
Age: 37
End: 2022-09-18

## 2022-11-10 ENCOUNTER — MYC MEDICAL ADVICE (OUTPATIENT)
Dept: NEUROLOGY | Facility: CLINIC | Age: 37
End: 2022-11-10

## 2022-11-10 DIAGNOSIS — G43.009 MIGRAINE WITHOUT AURA AND WITHOUT STATUS MIGRAINOSUS, NOT INTRACTABLE: ICD-10-CM

## 2022-11-10 NOTE — CONFIDENTIAL NOTE
Rx Authorization:    Requested Medication/ Dose; Rizatriptan 5MG     Date last refill ordered: 1/25/22    Quantity ordered: 18 tabs    # refills: 9    Date of last clinic visit with ordering provider: 7/22/22    Date of next clinic visit with ordering provider: F/U 1 year    All pertinent protocol data (lab date/result):     Include pertinent information from patients message:

## 2022-11-11 RX ORDER — RIZATRIPTAN BENZOATE 5 MG/1
5-10 TABLET, ORALLY DISINTEGRATING ORAL
Qty: 18 TABLET | Refills: 9 | Status: SHIPPED | OUTPATIENT
Start: 2022-11-11 | End: 2023-12-08

## 2023-01-01 NOTE — TELEPHONE ENCOUNTER
Acute migraine headache flare up form follow-up.  Patient reports worsening of the headache since Sunday.  Denies any new headache symptoms and reports that this is his typical migraine symptoms.  Patient reports that he stopped ibuprofen 5 days ago and have started headache preventive treatment with amitriptyline.  Patient reports that he took Imitrex 100 mg which did not do anything.  Patient reports some nausea with his migraine headache.  Discussed acute migraine flareup treatment plan with the patient.  Patient is comfortable with the plan.  Plan:  A trial of naproxen 500 mg every 12 hours as needed for headache.  May combine with prochlorperazine(Compazine) 2.5-5 mg every 6 hours as needed.  Compazine side effects discussed with the patient.  A trial of rizatriptan as needed 5 to 10 mg at headache onset and may repeat in 2 hours as needed with recurrent headache.  Maximum 30 mg in 24 hours.  Limit use to no more than 9 days per months.  If headaches does not resolve in the next day or 2 and continues to be debilitating recommended a trial of Medrol pack to try to break the headache cycle.  Headache prevention continue amitriptyline 10 mg at bedtime  Patient will: Clinic in 2 to 3 days or sooner if needed to update about his headache response to treatment    
No

## 2023-04-12 ENCOUNTER — MEDICAL CORRESPONDENCE (OUTPATIENT)
Dept: HEALTH INFORMATION MANAGEMENT | Facility: CLINIC | Age: 38
End: 2023-04-12
Payer: COMMERCIAL

## 2023-04-12 ENCOUNTER — TRANSFERRED RECORDS (OUTPATIENT)
Dept: HEALTH INFORMATION MANAGEMENT | Facility: CLINIC | Age: 38
End: 2023-04-12
Payer: COMMERCIAL

## 2023-04-17 ENCOUNTER — TRANSCRIBE ORDERS (OUTPATIENT)
Dept: OTHER | Age: 38
End: 2023-04-17

## 2023-04-17 DIAGNOSIS — J30.2 SEASONAL ALLERGIC RHINITIS: Primary | ICD-10-CM

## 2023-06-04 ENCOUNTER — HEALTH MAINTENANCE LETTER (OUTPATIENT)
Age: 38
End: 2023-06-04

## 2023-08-08 NOTE — TELEPHONE ENCOUNTER
FUTURE VISIT INFORMATION      FUTURE VISIT INFORMATION:  Date: 11.3.23  Time: 7:00  [place] [facility]  REFERRAL INFORMATION:  Referring provider:  Ren  Referring providers clinic:  [facility]  Reason for visit/diagnosis  Hx of seasonal allergis. Interested in allergy eval/testing/ referred by Dr Gail Mcclure/ recs in Epic/ appt sched per pt      RECORDS REQUESTED FROM:       Clinic name Comments Records Status Imaging Status   [facility] 4.12.23  [facility] Received  In Epic

## 2023-08-15 DIAGNOSIS — G43.009 MIGRAINE WITHOUT AURA AND WITHOUT STATUS MIGRAINOSUS, NOT INTRACTABLE: ICD-10-CM

## 2023-08-15 NOTE — TELEPHONE ENCOUNTER
Rx Authorization:  Requested Medication/ Dose: Ondansetron 4MG disintegrating tabs  Date last refill ordered: 7/22/22  Quantity ordered: 20 tabs  # refills: 3  Date of last clinic visit with ordering provider: 7/22/22  Date of next clinic visit with ordering provider: F/U 6 months  All pertinent protocol data (lab date/result):   Include pertinent information from patients message:

## 2023-08-16 RX ORDER — ONDANSETRON 4 MG/1
TABLET, ORALLY DISINTEGRATING ORAL
Qty: 20 TABLET | Refills: 0 | Status: SHIPPED | OUTPATIENT
Start: 2023-08-16

## 2023-11-03 ENCOUNTER — PRE VISIT (OUTPATIENT)
Dept: ALLERGY | Facility: CLINIC | Age: 38
End: 2023-11-03

## 2023-12-08 DIAGNOSIS — G43.009 MIGRAINE WITHOUT AURA AND WITHOUT STATUS MIGRAINOSUS, NOT INTRACTABLE: ICD-10-CM

## 2023-12-08 RX ORDER — RIZATRIPTAN BENZOATE 5 MG/1
5-10 TABLET, ORALLY DISINTEGRATING ORAL
Qty: 18 TABLET | Refills: 9 | Status: SHIPPED | OUTPATIENT
Start: 2023-12-08

## 2024-02-09 ENCOUNTER — TELEPHONE (OUTPATIENT)
Dept: NEUROLOGY | Facility: CLINIC | Age: 39
End: 2024-02-09

## 2024-02-09 ENCOUNTER — VIRTUAL VISIT (OUTPATIENT)
Dept: NEUROLOGY | Facility: CLINIC | Age: 39
End: 2024-02-09
Payer: COMMERCIAL

## 2024-02-09 VITALS — HEIGHT: 71 IN | BODY MASS INDEX: 26.6 KG/M2 | WEIGHT: 190 LBS

## 2024-02-09 DIAGNOSIS — G43.111 INTRACTABLE MIGRAINE WITH AURA WITH STATUS MIGRAINOSUS: Primary | ICD-10-CM

## 2024-02-09 DIAGNOSIS — G43.709 CHRONIC MIGRAINE WITHOUT AURA WITHOUT STATUS MIGRAINOSUS, NOT INTRACTABLE: ICD-10-CM

## 2024-02-09 PROCEDURE — 99213 OFFICE O/P EST LOW 20 MIN: CPT | Mod: 95 | Performed by: NURSE PRACTITIONER

## 2024-02-09 ASSESSMENT — MIGRAINE DISABILITY ASSESSMENT (MIDAS)
HOW MANY DAYS DID YOU NOT DO HOUSEWORK BECAUSE OF HEADACHES: 10
HOW MANY DAYS WAS YOUR PRODUCTIVITY CUT IN HALF BECAUSE OF HEADACHES: 60
HOW MANY DAYS DID YOU MISS WORK OR SCHOOL BECAUSE OF HEADACHES: 5
HOW MANY DAYS WAS HOUSEWORK PRODUCTIVITY CUT IN HALF DUE TO HEADACHES: 20
HOW OFTEN WERE SOCIAL ACTIVITIES MISSED DUE TO HEADACHES: 10
ON A SCALE FROM 0-10 ON AVERAGE HOW PAINFUL WERE HEADACHES: 5
TOTAL SCORE: 105
HOW MANY DAYS IN THE PAST 3 MONTHS HAVE YOU HAD A HEADACHE: 90

## 2024-02-09 ASSESSMENT — HEADACHE IMPACT TEST (HIT 6)
HOW OFTEN DO HEADACHES LIMIT YOUR DAILY ACTIVITIES: VERY OFTEN
HIT6 TOTAL SCORE: 63
WHEN YOU HAVE A HEADACHE HOW OFTEN DO YOU WISH YOU COULD LIE DOWN: VERY OFTEN
HOW OFTEN HAVE YOU FELT TOO TIRED TO WORK BECAUSE OF YOUR HEADACHES: SOMETIMES
WHEN YOU HAVE HEADACHES HOW OFTEN IS THE PAIN SEVERE: SOMETIMES
HOW OFTEN HAVE YOU FELT TOO TIRED TO WORK BECAUSE OF YOUR HEADACHES: SOMETIMES
HOW OFTEN DID HEADACHS LIMIT CONCENTRATION ON WORK OR DAILY ACTIVITY: VERY OFTEN
HOW OFTEN HAVE YOU FELT FED UP OR IRRITATED BECAUSE OF YOUR HEADACHES: SOMETIMES
HIT6 TOTAL SCORE: 63
WHEN YOU HAVE HEADACHES HOW OFTEN IS THE PAIN SEVERE: SOMETIMES
HOW OFTEN HAVE YOU FELT FED UP OR IRRITATED BECAUSE OF YOUR HEADACHES: SOMETIMES
HOW OFTEN DO HEADACHES LIMIT YOUR DAILY ACTIVITIES: VERY OFTEN
HOW OFTEN DID HEADACHS LIMIT CONCENTRATION ON WORK OR DAILY ACTIVITY: VERY OFTEN
WHEN YOU HAVE A HEADACHE HOW OFTEN DO YOU WISH YOU COULD LIE DOWN: VERY OFTEN

## 2024-02-09 ASSESSMENT — PAIN SCALES - GENERAL: PAINLEVEL: NO PAIN (0)

## 2024-02-09 NOTE — TELEPHONE ENCOUNTER
Retail Pharmacy Prior Authorization Team   Phone: [phone]    Note: Due to record-high volumes, our turn-around is time taking longer than normal . We are currently 8 days behind in the pools.   We are working diligently to submit all requests in a timely manner and in the order they are received. Please only flag true urgent requests as high priority to the pool at this time.   If you have questions - please send a note/message in the active PA encounter and send back to the RPPA (Retail Pharmacy Prior Authorization) team [[id number]].    If you have more specific questions about our process please reach out to our supervisor Sienna Veras.   Thank you!

## 2024-02-09 NOTE — PROGRESS NOTES
Dustin is a 38 year old who is being evaluated via a billable video visit.          Subjective   Dustin is a 38 year old, presenting for the following health issues:headache   RECHECK  Initial headache clinic visit in March 2020.  Patient was seen for daily headaches.  Family history of headaches grandmother.  Patient's headache was felt to be mixed in etiology possible migraine, tension and acute analgesics overuse headaches at the time of his initial visit.  Patient tried to out amitriptyline for migraine prevention and sumatriptan as needed and was asked to use over-the-counter analgesics as needed and limit use to no more than 10 days/month.  Amitriptyline was stopped at some point because was causing tiredness and did not help with headaches.  At the headache treatment tried-propranolol possibly tried, nortriptyline did not help, topiramate, rizatriptan, gabapentin  During one of the follow-up visits we did review of trial of monthly injections/oral CGRP's, Botox treatment.  Has been doing good-headaches ups and down. Tried Emgality last year and did help somewhat but stopped using -felt bloated and constipated. Stopped in Fall of 2022. Had fewer headaches and less need for rizatriptan.   Headaches still on the daily basis. Takes Excedrin 2-3/day daily and tried to stop it a few times but would be using advil. Excedrin provides with a consistent effect but not a long term solution  Total headaches 30 days out of 30 days/month and intense 2-3/month   Before was taking advil and stopped it --not effective  Eletriptan 18 tabs in 25-30 days/month   Last stage PhD program and once done will stop excedrin   Headache prettry much the same, no new symptoms or red flags       Headache Plan reviewed :  A trial of nurtec every other day   Do not take excedrin the day you don't take nurtec  Rizatriptan as needed for a severe migraine and limit use no more than 9 days per month   Follow up in 3 months or sooner if  needed       DATA/Chart review  Saw Mari Conte on 12/19/2022 at [place] for headache, note reviewed    Meds /treatment past reviewd  Emgality tried   Propranolol -does not recall does not remember a trial and probably was not effective  Amitriptyline caused tiredness feeling in the higher dose and did not help with headache  Nortriptyline not helpNortriptyline in 2018 and did not work   Propranolol -no relief  Rizatriptan-currently   Sumatriptan-stopped working   Topiramate-stopped working    Objective    Vitals - Patient Reported  Pain Score: No Pain (0)    Physical Exam   Patient is alert and no in apparent acute distress,  mentation appears normal, judgement and insight intact, normal speech, no weakness observed    I discussed all my recommendations with Dustin Rainey who verbalizes understanding and comfortable with the plan.      21 minutes spent on the date of the encounter doing video access, chart  review,  meds review, treatment plan, documentation and further activities as noted above    KEVIN Shaw, CNP [facility]  Headache certified  [facility]    Video-Visit Details  Type of service:  Video Visit   Originating Location (pt. Location): Home  Distant Location (provider location):  Off-site  Platform used for Video Visit: Bethanie

## 2024-02-09 NOTE — LETTER
2/9/2024       RE: Dustin Rainey  [address]  [facility] [address]     Dear Colleague,    Thank you for referring your patient, Dustin Rainey, to the [facility]UROLOGY [facility] [place] at [facility]. Please see a copy of my visit note below.    Dustin is a 38 year old who is being evaluated via a billable video visit.          Subjective  Dustin is a 38 year old, presenting for the following health issues:headache   RECHECK  Initial headache clinic visit in March 2020.  Patient was seen for daily headaches.  Family history of headaches grandmother.  Patient's headache was felt to be mixed in etiology possible migraine, tension and acute analgesics overuse headaches at the time of his initial visit.  Patient tried to out amitriptyline for migraine prevention and sumatriptan as needed and was asked to use over-the-counter analgesics as needed and limit use to no more than 10 days/month.  Amitriptyline was stopped at some point because was causing tiredness and did not help with headaches.  At the headache treatment tried-propranolol possibly tried, nortriptyline did not help, topiramate, rizatriptan, gabapentin  During one of the follow-up visits we did review of trial of monthly injections/oral CGRP's, Botox treatment.  Has been doing good-headaches ups and down. Tried Emgality last year and did help somewhat but stopped using -felt bloated and constipated. Stopped in Fall of 2022. Had fewer headaches and less need for rizatriptan.   Headaches still on the daily basis. Takes Excedrin 2-3/day daily and tried to stop it a few times but would be using advil. Excedrin provides with a consistent effect but not a long term solution  Total headaches 30 days out of 30 days/month and intense 2-3/month   Before was taking advil and stopped it --not effective  Eletriptan 18 tabs in 25-30 days/month   Last stage PhD program and once done  will stop excedrin   Headache prettry much the same, no new symptoms or red flags       Headache Plan reviewed :  A trial of nurtec every other day   Do not take excedrin the day you don't take nurtec  Rizatriptan as needed for a severe migraine and limit use no more than 9 days per month   Follow up in 3 months or sooner if needed       DATA/Chart review  Saw Mari Conte on 12/19/2022 at [facility] for headache, note reviewed    Meds /treatment past reviewd  Emgality tried   Propranolol -does not recall does not remember a trial and probably was not effective  Amitriptyline caused tiredness feeling in the higher dose and did not help with headache  Nortriptyline not helpNortriptyline in 2018 and did not work   Propranolol -no relief  Rizatriptan-currently   Sumatriptan-stopped working   Topiramate-stopped working    Objective   Vitals - Patient Reported  Pain Score: No Pain (0)    Physical Exam   Patient is alert and no in apparent acute distress,  mentation appears normal, judgement and insight intact, normal speech, no weakness observed    I discussed all my recommendations with Dustin Rainey who verbalizes understanding and comfortable with the plan.      21 minutes spent on the date of the encounter doing video access, chart  review,  meds review, treatment plan, documentation and further activities as noted above        Again, thank you for allowing me to participate in the care of your patient.      Sincerely,    KEVIN Christianson CNP

## 2024-02-09 NOTE — NURSING NOTE
Is the patient currently in the state of [place]? YES    Visit mode:VIDEO    If the visit is dropped, the patient can be reconnected by: VIDEO VISIT: Text to cell phone:   Telephone Information:   Mobile [phone]    and VIDEO VISIT: Send to e-mail at: [email or web address]    Will anyone else be joining the visit? NO  (If patient encounters technical issues they should call [phone])    How would you like to obtain your AVS? MyChart    Are changes needed to the allergy or medication list? No    Reason for visit: ELSA ALONSO

## 2024-02-23 NOTE — TELEPHONE ENCOUNTER
PA Initiation    Medication: NURTEC 75 MG PO TBDP  Insurance Company: [employer] [place] - Phone [phone] Fax [phone]  Pharmacy Filling the Rx: [facility] - [place], [place] - [address] [place] [address] [address]  Filling Pharmacy Phone:    Filling Pharmacy Fax:    Start Date: 2/23/2024

## 2024-02-23 NOTE — TELEPHONE ENCOUNTER
Prior Authorization Approval    Medication: NURTEC 75 MG PO TBDP  Authorization Effective Date: 1/24/2024  Authorization Expiration Date: 2/23/2025  Approved Dose/Quantity: UD  Reference #:     Insurance Company: [employer] [place] - Phone [phone] Fax [phone]  Expected CoPay: $ 25  CoPay Card Available: No    Financial Assistance Needed: n/a  Which Pharmacy is filling the prescription: [facility] - [place], [place] - [address] [place] [address] [address]  Pharmacy Notified: yes  Patient Notified: no

## 2024-05-22 NOTE — TELEPHONE ENCOUNTER
Patient needs follow up for further refills. Thank you   Patient was walked to bathroom and given privacy in bathroom patient stated that he became dizzy in bathroom and sat on toilet patient stated that he was fine and did not hit head. Cath site was still WNL. Continue to monitor.

## 2024-07-10 ENCOUNTER — ANCILLARY PROCEDURE (OUTPATIENT)
Dept: CT IMAGING | Facility: CLINIC | Age: 39
End: 2024-07-10
Attending: INTERNAL MEDICINE
Payer: COMMERCIAL

## 2024-07-10 DIAGNOSIS — R05.3 CHRONIC COUGH: ICD-10-CM

## 2024-07-10 DIAGNOSIS — R07.89 CHEST WALL PAIN: ICD-10-CM

## 2024-07-10 PROCEDURE — 71260 CT THORAX DX C+: CPT | Mod: GC | Performed by: RADIOLOGY

## 2024-07-10 RX ORDER — IOPAMIDOL 755 MG/ML
93 INJECTION, SOLUTION INTRAVASCULAR ONCE
Status: COMPLETED | OUTPATIENT
Start: 2024-07-10 | End: 2024-07-10

## 2024-07-10 RX ADMIN — IOPAMIDOL 93 ML: 755 INJECTION, SOLUTION INTRAVASCULAR at 17:43

## 2024-07-10 NOTE — DISCHARGE INSTRUCTIONS

## 2024-07-14 ENCOUNTER — HEALTH MAINTENANCE LETTER (OUTPATIENT)
Age: 39
End: 2024-07-14

## 2024-07-15 ENCOUNTER — TRANSCRIBE ORDERS (OUTPATIENT)
Dept: OTHER | Age: 39
End: 2024-07-15

## 2024-07-15 DIAGNOSIS — R05.3 CHRONIC COUGH: Primary | ICD-10-CM

## 2024-07-15 DIAGNOSIS — R07.89 CHEST WALL PAIN: ICD-10-CM

## 2024-07-16 ENCOUNTER — OFFICE VISIT (OUTPATIENT)
Dept: PULMONOLOGY | Facility: CLINIC | Age: 39
End: 2024-07-16
Attending: INTERNAL MEDICINE
Payer: COMMERCIAL

## 2024-07-16 DIAGNOSIS — R05.3 CHRONIC COUGH: Primary | ICD-10-CM

## 2024-07-16 DIAGNOSIS — R05.3 CHRONIC COUGH: ICD-10-CM

## 2024-07-16 PROCEDURE — 94060 EVALUATION OF WHEEZING: CPT | Performed by: INTERNAL MEDICINE

## 2024-07-16 PROCEDURE — 94726 PLETHYSMOGRAPHY LUNG VOLUMES: CPT | Performed by: INTERNAL MEDICINE

## 2024-07-16 PROCEDURE — 94729 DIFFUSING CAPACITY: CPT | Performed by: INTERNAL MEDICINE

## 2024-07-17 ENCOUNTER — OFFICE VISIT (OUTPATIENT)
Dept: PULMONOLOGY | Facility: CLINIC | Age: 39
End: 2024-07-17
Attending: INTERNAL MEDICINE
Payer: COMMERCIAL

## 2024-07-17 VITALS — OXYGEN SATURATION: 95 % | HEART RATE: 76 BPM | DIASTOLIC BLOOD PRESSURE: 89 MMHG | SYSTOLIC BLOOD PRESSURE: 140 MMHG

## 2024-07-17 DIAGNOSIS — R07.89 CHEST WALL PAIN: ICD-10-CM

## 2024-07-17 DIAGNOSIS — R05.3 CHRONIC COUGH: ICD-10-CM

## 2024-07-17 LAB
DLCOUNC-%PRED-PRE: 115 %
DLCOUNC-PRE: 37.67 ML/MIN/MMHG
DLCOUNC-PRED: 32.59 ML/MIN/MMHG
ERV-%PRED-PRE: 74 %
ERV-PRE: 1.36 L
ERV-PRED: 1.82 L
EXPTIME-PRE: 7.35 SEC
FEF2575-%PRED-POST: 91 %
FEF2575-%PRED-PRE: 88 %
FEF2575-POST: 3.74 L/SEC
FEF2575-PRE: 3.62 L/SEC
FEF2575-PRED: 4.11 L/SEC
FEFMAX-%PRED-PRE: 79 %
FEFMAX-PRE: 8.31 L/SEC
FEFMAX-PRED: 10.45 L/SEC
FEV1-%PRED-PRE: 110 %
FEV1-PRE: 4.56 L
FEV1FEV6-PRE: 72 %
FEV1FEV6-PRED: 82 %
FEV1FVC-PRE: 72 %
FEV1FVC-PRED: 82 %
FEV1SVC-PRE: 76 %
FEV1SVC-PRED: 79 %
FIFMAX-PRE: 5.51 L/SEC
FRCPLETH-%PRED-PRE: 91 %
FRCPLETH-PRE: 3.16 L
FRCPLETH-PRED: 3.47 L
FVC-%PRED-PRE: 126 %
FVC-PRE: 6.38 L
FVC-PRED: 5.06 L
IC-%PRED-PRE: 127 %
IC-PRE: 4.66 L
IC-PRED: 3.64 L
RVPLETH-%PRED-PRE: 92 %
RVPLETH-PRE: 1.8 L
RVPLETH-PRED: 1.95 L
TLCPLETH-%PRED-PRE: 106 %
TLCPLETH-PRE: 7.81 L
TLCPLETH-PRED: 7.33 L
VA-%PRED-PRE: 108 %
VA-PRE: 7.51 L
VC-%PRED-PRE: 115 %
VC-PRE: 6.01 L
VC-PRED: 5.22 L

## 2024-07-17 PROCEDURE — 99205 OFFICE O/P NEW HI 60 MIN: CPT | Performed by: INTERNAL MEDICINE

## 2024-07-17 PROCEDURE — G2211 COMPLEX E/M VISIT ADD ON: HCPCS | Performed by: INTERNAL MEDICINE

## 2024-07-17 RX ORDER — BENZONATATE 100 MG/1
200 CAPSULE ORAL 3 TIMES DAILY PRN
Qty: 180 CAPSULE | Refills: 0 | Status: SHIPPED | OUTPATIENT
Start: 2024-07-17 | End: 2024-08-16

## 2024-07-17 NOTE — NURSING NOTE
Dustin Rainey's goals for this visit include:   Chief Complaint   Patient presents with    Consult     Referred by Dr. Goldberg  Chronic cough  Chest wall pain       He requests these members of his care team be copied on today's visit information: yes     PCP: No Ref-Primary, Physician    Referring Provider:  Edith Goldberg MD  [address] [address]    BP (!) 140/89 (BP Location: Left arm, Patient Position: Chair, Cuff Size: Adult Regular)   Pulse 76   SpO2 95%     Do you need any medication refills at today's visit? No     MONCHO Toro   Neph/Pul[facility]

## 2024-07-17 NOTE — PROGRESS NOTES
Pulmonary Clinic New Patient Consult  Reason for Consult: Chronic cough  History of Present Illness  I had the pleasure of seeing Dustin Rainey, who is a 39 year old female who presents for an evaluation of chronic cough.  Symptoms started in January 2024, no inciting incident such as viral illness nor URI. Cough is dry and appears to be triggered by speech or breathing deeply. He denies any reflux symptoms, no sinus symptoms nor post nasal drip and no history of childhood asthma. No wheezing nor chest tightness.  Symptoms are not related to temperature, whether, humidity nor body positioning. He has been tried on PPIs as well as ICS and there has been no improvement in his symptoms. He continues to use his QVAR adherently. He does have a right sided chest pain that is not reproducible and he is not sure if it is related to his symptoms of cough.  No pets. No family hx of lung cancer.     Review of Systems:  10 of 14 systems reviewed and are negative unless otherwise stated in HPI.    No past medical history on file.    No past surgical history on file.    No family history on file.    Social History     Socioeconomic History    Marital status: Single     Spouse name: None    Number of children: None    Years of education: None    Highest education level: None   Tobacco Use    Smoking status: Never    Smokeless tobacco: Never   Vaping Use    Vaping status: Never Used     Social Determinants of Health     Financial Resource Strain: Low Risk  (12/19/2022)    Received from [facility]    Overall Financial Resource Strain (CARDIA)     Difficulty of Paying Living Expenses: Not hard at all   Food Insecurity: No Food Insecurity (12/19/2022)    Received from [facility]    Hunger Vital Sign     Worried About Running Out of Food in the Last Year: Never true     Ran Out of Food in the Last Year: Never true   Transportation Needs: No Transportation Needs (12/19/2022)    Received from [facility],  [facility]    PRAPARE - Transportation     Lack of Transportation (Medical): No     Lack of Transportation (Non-Medical): No   Physical Activity: Sufficiently Active (12/19/2022)    Received from [facility]    Exercise Vital Sign     Days of Exercise per Week: 6 days     Minutes of Exercise per Session: 60 min   Stress: Stress Concern Present (12/19/2022)    [facility]    [employer] [place] [employer] - Occupational Stress Questionnaire     Feeling of Stress : To some extent   Social Connections: Moderately Isolated (12/19/2022)    [facility], [facility]    Social Connection and Isolation Panel [NHANES]     Frequency of Communication with Friends and Family: Once a week     Frequency of Social Gatherings with Friends and Family: Twice a week     Attends Sabianist Services: Never     Active Member of Clubs or Organizations: Yes     Attends Club or Organization Meetings: 1 to 4 times per year     Marital Status: Never    Interpersonal Safety: Not At Risk (12/19/2022)    Received from [facility]    Humiliation, Afraid, Rape, and Kick questionnaire     Fear of Current or Ex-Partner: No     Emotionally Abused: No     Physically Abused: No     Sexually Abused: No   Housing Stability: Low Risk  (12/19/2022)    Received from [facility]    Housing Stability Vital Sign     Unable to Pay for Housing in the Last Year: No     Number of Places Lived in the Last Year: 2     Unstable Housing in the Last Year: No         Allergies   Allergen Reactions    Peanut-Containing Drug Products      Other reaction(s): Hives  One episode of hives eating peanuts out of shell as a child.     Seasonal Allergies          Current Outpatient Medications:     ondansetron (ZOFRAN ODT) 4 MG ODT tab, DISSOLVE ONE TABLET in MOUTH EVERY 8 HOURS AS NEEDED FOR NAUSEA, Disp: 20 tablet, Rfl: 0    rimegepant (NURTEC) 75 MG ODT tablet, Place 1 tablet (75  "mg) under the tongue every 48 hours, Disp: 16 tablet, Rfl: 11    rizatriptan (MAXALT-MLT) 5 MG ODT, Take 1-2 tablets (5-10 mg) by mouth at onset of headache for migraine (may repeat in 2 hours as needed. Max 30 mg in 24 hours), Disp: 18 tablet, Rfl: 9      Physical Exam:  BP (!) 140/89 (BP Location: Left arm, Patient Position: Chair, Cuff Size: Adult Regular)   Pulse 76   SpO2 95%   GENERAL: Well developed, well nourished, alert, and in no apparent distress.  HEENT: Normocephalic, atraumatic. PERRL, EOMI. Oral mucosa is moist. No perioral cyanosis.  NECK: supple, no masses, no thyromegaly.  RESP:  Normal respiratory effort.  CTAB.  No rales, wheezes, rhonchi.  No cyanosis or clubbing.  CV: Normal S1, S2, regular rhythm, normal rate. No murmur.  No LE edema.   ABDOMEN:  Soft, non-tender, non-distended.   SKIN: warm and dry. No rash.  NEURO: AAOx3.  Normal gait.  Fluent speech.  PSYCH: mentation appears normal.       Results:  PFTs:  Most Recent [facility] Pulmonary Function Testing    FVC-Pred   Date Value Ref Range Status   07/16/2024 5.06 L      FVC-Pre   Date Value Ref Range Status   07/16/2024 6.38 L      FVC-%Pred-Pre   Date Value Ref Range Status   07/16/2024 126 %      FEV1-Pre   Date Value Ref Range Status   07/16/2024 4.56 L      FEV1-%Pred-Pre   Date Value Ref Range Status   07/16/2024 110 %      FEV1FVC-Pred   Date Value Ref Range Status   07/16/2024 82 %      FEV1FVC-Pre   Date Value Ref Range Status   07/16/2024 72 %      No results found for: \"20029\"  FEFMax-Pred   Date Value Ref Range Status   07/16/2024 10.45 L/sec      FEFMax-Pre   Date Value Ref Range Status   07/16/2024 8.31 L/sec      FEFMax-%Pred-Pre   Date Value Ref Range Status   07/16/2024 79 %      ExpTime-Pre   Date Value Ref Range Status   07/16/2024 7.35 sec      FIFMax-Pre   Date Value Ref Range Status   07/16/2024 5.51 L/sec      FEV1FEV6-Pred   Date Value Ref Range Status   07/16/2024 82 %      FEV1FEV6-Pre   Date Value Ref Range Status " "  07/16/2024 72 %      No results found for: \"20055\"   Imaging (personally reviewed in clinic today):  Impression:   1. No suspicious pulmonary nodules or thoracic adenopathy. No chest wall or pulmonary parenchymal abnormality identified.   2. Thyromegaly with heterogenous parenchyma could represent sequela of thyroiditis.       Assessment and Plan:   Chronic Cough  I reviewed his recent ct chest which showed no evidence of airway nor airspace disease. Normal spirometry on today's PFTs. We discussed that in the context of a non smoking status, normal chest imaging and unremarkable PFTs, the most common causes of cough includes rhinosinusitis, GERD and reactive airway disease. His history is suggestive of neither of these. Moreover, he has been tried on PPIs as well as ICS and there has been no improvement in his symptoms.   I will try him on Tessalon Perles to see if he derives any benefit. Prescriptions were given today with refills.   If his symptoms are persistent, I will refer him to speech for voice training to help with the cough. We also discussed cough hypersensitivity syndrome as a possible explanation and we will consider medications like gabapentin, amitriptyline or lyrica     Questions and concerns were answered to the patient's satisfaction.  he was provided with my contact information should new questions or concerns arise in the interim.  he should return to clinic in 6 months   Up to date on vaccination    I spent 60 minutes on the date of the encounter doing chart review, history and exam, documentation and further coordination as noted above exclusive of time interpreting PFT, Chest Xray, CT Chest.  Kaylen Carlton MD  Pulmonary, Critical Care and Sleep Medicine  [facility]  Office: [phone]      The above note was dictated using voice recognition software and may include typographical errors. Please contact the author for any clarifications.                                   "

## 2024-11-04 DIAGNOSIS — G43.009 MIGRAINE WITHOUT AURA AND WITHOUT STATUS MIGRAINOSUS, NOT INTRACTABLE: ICD-10-CM

## 2024-11-04 NOTE — TELEPHONE ENCOUNTER
RX Authorization    Medication: Rizatriptan (MAXALT-MLT) 5 MG ODT    Date last refill ordered: 12/8/2023    Quantity ordered: 18    # refills: 9    Date of last clinic visit with ordering provider: 2/9/2024    Date of next clinic visit with ordering provider:    All pertinent protocol data (lab date/result):    Include pertinent information from patients message:

## 2024-11-06 RX ORDER — RIZATRIPTAN BENZOATE 5 MG/1
5-10 TABLET, ORALLY DISINTEGRATING ORAL
Qty: 18 TABLET | Refills: 9 | Status: SHIPPED | OUTPATIENT
Start: 2024-11-06

## 2025-02-25 ENCOUNTER — MYC REFILL (OUTPATIENT)
Dept: NEUROLOGY | Facility: CLINIC | Age: 40
End: 2025-02-25
Payer: COMMERCIAL

## 2025-02-25 DIAGNOSIS — G43.111 INTRACTABLE MIGRAINE WITH AURA WITH STATUS MIGRAINOSUS: ICD-10-CM

## 2025-02-25 NOTE — TELEPHONE ENCOUNTER
PT hasn't been seen since 02/2024. I messaged the PT and attached a note for the pharmacy.     I T'd up a 90 day supply until a return is scheduled.     Please refill if appropriate, thank you!                     RX Authorization    Medication: rimegepant (NURTEC) 75 MG ODT tablet     Date last refill ordered: 02/09/2024    Quantity ordered: 16     # refills: 2    Date of last clinic visit with ordering provider: 02/09/2024    Date of next clinic visit with ordering provider: None Scheduled

## 2025-02-26 DIAGNOSIS — G43.111 INTRACTABLE MIGRAINE WITH AURA WITH STATUS MIGRAINOSUS: ICD-10-CM

## 2025-03-03 RX ORDER — RIMEGEPANT SULFATE 75 MG/75MG
TABLET, ORALLY DISINTEGRATING ORAL
Qty: 16 TABLET | Refills: 1 | OUTPATIENT
Start: 2025-03-03

## 2025-05-12 ENCOUNTER — TRANSCRIBE ORDERS (OUTPATIENT)
Dept: OTHER | Age: 40
End: 2025-05-12

## 2025-05-12 DIAGNOSIS — R07.9 CHEST PAIN: Primary | ICD-10-CM

## 2025-05-16 ENCOUNTER — HOSPITAL ENCOUNTER (OUTPATIENT)
Dept: CARDIOLOGY | Facility: CLINIC | Age: 40
Discharge: HOME | End: 2025-05-16
Attending: FAMILY MEDICINE | Admitting: FAMILY MEDICINE
Payer: COMMERCIAL

## 2025-05-16 DIAGNOSIS — R07.9 CHEST PAIN: ICD-10-CM

## 2025-05-16 PROCEDURE — 93018 CV STRESS TEST I&R ONLY: CPT | Mod: GC | Performed by: INTERNAL MEDICINE

## 2025-05-16 PROCEDURE — 93325 DOPPLER ECHO COLOR FLOW MAPG: CPT | Mod: TC

## 2025-05-16 PROCEDURE — 93321 DOPPLER ECHO F-UP/LMTD STD: CPT | Mod: 26 | Performed by: INTERNAL MEDICINE

## 2025-05-16 PROCEDURE — 255N000002 HC RX 255 OP 636: Performed by: INTERNAL MEDICINE

## 2025-05-16 PROCEDURE — 93325 DOPPLER ECHO COLOR FLOW MAPG: CPT | Mod: 26 | Performed by: INTERNAL MEDICINE

## 2025-05-16 PROCEDURE — 93350 STRESS TTE ONLY: CPT | Mod: 26 | Performed by: INTERNAL MEDICINE

## 2025-05-16 PROCEDURE — 93016 CV STRESS TEST SUPVJ ONLY: CPT | Performed by: INTERNAL MEDICINE

## 2025-05-16 RX ADMIN — PERFLUTREN 5 ML: 6.52 INJECTION, SUSPENSION INTRAVENOUS at 14:02

## 2025-06-10 ENCOUNTER — OFFICE VISIT (OUTPATIENT)
Dept: NEUROLOGY | Facility: CLINIC | Age: 40
End: 2025-06-10
Payer: COMMERCIAL

## 2025-06-10 VITALS
RESPIRATION RATE: 16 BRPM | DIASTOLIC BLOOD PRESSURE: 94 MMHG | OXYGEN SATURATION: 95 % | SYSTOLIC BLOOD PRESSURE: 144 MMHG | HEART RATE: 100 BPM

## 2025-06-10 DIAGNOSIS — G43.709 CHRONIC MIGRAINE WITHOUT AURA WITHOUT STATUS MIGRAINOSUS, NOT INTRACTABLE: Primary | ICD-10-CM

## 2025-06-10 DIAGNOSIS — G43.009 MIGRAINE WITHOUT AURA AND WITHOUT STATUS MIGRAINOSUS, NOT INTRACTABLE: ICD-10-CM

## 2025-06-10 PROCEDURE — 99213 OFFICE O/P EST LOW 20 MIN: CPT | Performed by: NURSE PRACTITIONER

## 2025-06-10 PROCEDURE — 3080F DIAST BP >= 90 MM HG: CPT | Performed by: NURSE PRACTITIONER

## 2025-06-10 PROCEDURE — 3077F SYST BP >= 140 MM HG: CPT | Performed by: NURSE PRACTITIONER

## 2025-06-10 ASSESSMENT — HEADACHE IMPACT TEST (HIT 6)
HOW OFTEN HAVE YOU FELT TOO TIRED TO WORK BECAUSE OF YOUR HEADACHES: VERY OFTEN
HIT6 TOTAL SCORE: 64
WHEN YOU HAVE HEADACHES HOW OFTEN IS THE PAIN SEVERE: SOMETIMES
WHEN YOU HAVE A HEADACHE HOW OFTEN DO YOU WISH YOU COULD LIE DOWN: VERY OFTEN
HOW OFTEN HAVE YOU FELT FED UP OR IRRITATED BECAUSE OF YOUR HEADACHES: SOMETIMES
HOW OFTEN DO HEADACHES LIMIT YOUR DAILY ACTIVITIES: VERY OFTEN
HOW OFTEN DID HEADACHS LIMIT CONCENTRATION ON WORK OR DAILY ACTIVITY: VERY OFTEN

## 2025-06-10 ASSESSMENT — MIGRAINE DISABILITY ASSESSMENT (MIDAS)
HOW OFTEN WERE SOCIAL ACTIVITIES MISSED DUE TO HEADACHES: 5
ON A SCALE FROM 0-10 ON AVERAGE HOW PAINFUL WERE HEADACHES: 5
HOW MANY DAYS DID YOU MISS WORK OR SCHOOL BECAUSE OF HEADACHES: 5
HOW MANY DAYS WAS YOUR PRODUCTIVITY CUT IN HALF BECAUSE OF HEADACHES: 45
TOTAL SCORE: 75
HOW MANY DAYS WAS HOUSEWORK PRODUCTIVITY CUT IN HALF DUE TO HEADACHES: 10
HOW MANY DAYS DID YOU NOT DO HOUSEWORK BECAUSE OF HEADACHES: 10
HOW MANY DAYS IN THE PAST 3 MONTHS HAVE YOU HAD A HEADACHE: 85

## 2025-06-10 NOTE — PATIENT INSTRUCTIONS
Updated eye exam   Offer to be seen at [facility]  visit to better define headaches and help with treatment plan/co-management and patient accepted.   Will adjust your treatment after your [facility] visit.

## 2025-06-10 NOTE — LETTER
6/10/2025       RE: Dustin Rainey  [address]  [place] [address]     Dear Colleague,    Thank you for referring your patient, Dustin Rainey, to the [facility] [place] at [facility]. Please see a copy of my visit note below.    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  Please see subjective section for treatments tried for headache management in the past.  Brain MRI with and without contrast in August 2020 reviewed and it was reported as essentially normal brain MRI.   Suggested to get an updated eye exam   Offer to be seen at [facility]  visit to better define headaches and help with treatment plan/co-management and patient accepted.   Will adjust your treatment after your [facility] visit.     Follow-up after comprehensive headache clinic visit to review the plan      The longitudinal plan of care for headache for Dustin was addressed during this visit. Due to the added complexity in care, I will continue to support Dustin in the subsequent management of this condition(s) and with the ongoing continuity of care of this condition(s).    23 minutes spent on the date of the encounter doing face to face access, chart  review,  results review,  meds review, treatment plan, documentation and further activities as noted above    KEVIN Shaw, CNP [facility]    Subjective:    Dustin Rainey returns for follow up of headaches   Initial headache clinic visit on 3/11/2020, see note for details  Family history of headaches-grandmother would get headaches.  Headaches  became regular in the past several years as reported in 2020.  Updates today:  Daily headaches typically 5/10 on the numeric pain scale in 30 days out of 30 days/month and severe migraines frequency 3-4 days /month    Right side most of the time and sometimes on the left.  Nausea and light sensitivity with migraine headaches.  No new symptoms with headaches and consistent with typical headache symptoms.  No new changes in headaches since last brain MRI in 2020.   Able to sleep well most of the time.   Has a consistent sleep schedule  Exercise   Stress might be a trigger     Meds /treatment tried for headache management  Excedrin tried to stop it in May only for a day -1-2 pills /day and some day wakes up fine but than headache coming on in the afternoon and takes Excedrin and feels good. Tried to stop it and developed a severe headaches.   Emgality tried -was not very effective for 9 months. Headache tends to grow more severe and if does not stop it than headache is more severe.   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  General: Cooperative, NAD  Neurologic:  Mental Status: Fully alert, attentive and oriented. Speech clear and fluent.   Cranial Nerves: Facial movements symmetric.   Motor: No  abnormal movements.      Pertinent Investigations:    Reviewed  Component  Ref Range & Units (hover) 1 mo ago     GFR Estimated (External) >60    Sodium (External) 141    Potassium (External) 4.4    Chloride (External) 106    CO2 (External) 28    Glucose (External) 104    Urea Nitrogen (External) 15.4    Creatinine (External) 0.87    Calcium (External) 9.3     Reviewed  MR BRAIN W/O & W CONTRAST 8/26/2020 12:57 PM     Provided History: worsening headache; Worsening headaches.  ICD-10: Worsening headaches     Comparison: None.     Technique: Multiplanar T1-weighted, axial FLAIR, and susceptibility  images were obtained without intravenous contrast. Following  intravenous gadolinium-based contrast administration, axial  T2-weighted, diffusion, and T1-weighted images (in multiple planes)  were obtained.     Contrast: 7.5 mL Gadavist      Findings:  There is no mass effect, midline shift, or evidence of intracranial  hemorrhage. The ventricles are proportionate to the cerebral sulci.  Normal major vascular intracranial flow-voids. Small right  temporo-occipital DVA.     Postcontrast images demonstrate no abnormal intracranial enhancement.     No abnormality of the skull marrow signal. The visualized portions of  paranasal sinuses, and mastoid air cells are relatively clear. The  orbits are grossly unremarkable.                                                                      Impression: Essentially normal brain MRI      I have personally reviewed the examination and initial interpretation  and I agree with the findings.     EMBER CARVAJAL MD        7/20/2022     6:21 PM 2/9/2024    10:45 AM 6/10/2025     8:20 AM   HIT-6   When you have headaches, how often is the pain severe 10 10 10   How often do headaches limit your ability to do usual daily activities including household work, work, school, or social activities? 11 11 11   When you have a headache, how often do you wish you could lie down? 10 11 11   In the  past 4 weeks, how often have you felt too tired to do work or daily activities because of your headaches 10 10 11   In the past 4 weeks, how often have you felt fed up or irritated because of your headaches 11 10 10   In the past 4 weeks, how often did headaches limit your ability to concentrate on work or daily activities 11 11 11   HIT-6 Total Score 63 63 64        Patient-reported           7/22/2022     8:54 AM 2/9/2024    10:48 AM 6/10/2025     8:22 AM   MIDAS - in the past three months:   On how many days did you miss work or school because of your headaches? 3 5 5   How many days was your productivity at work or school reduced by half or more because of your headaches? 45 60 45   On how many days did you not do household work because of your headaches? 5 10 10   How many days was your productivity in household work reduced by half or more because of your headaches? 30 20 10   On how many days did you miss family, social, or leisure activities because of your headaches? 2 10 5   On how many days did you have a headache? 75 90 85   On a scale of 0-10, on average how painful were these headaches? 6 5 5   MIDAS Score 85 (IV - Severe Disability) 105 (IV - Severe Disability) 75 (IV - Severe Disability)          Again, thank you for allowing me to participate in the care of your patient.      Sincerely,    KEVIN Christianson CNP

## 2025-06-10 NOTE — NURSING NOTE
Chief Complaint   Patient presents with    RECHECK     Return Headache         BP (!) 144/94 (BP Location: Left arm, Patient Position: Sitting, Cuff Size: Adult Large)   Pulse 100   Resp 16   SpO2 95%       Mayelin Dow

## 2025-06-10 NOTE — PROGRESS NOTES
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  Please see subjective section for treatments tried for headache management in the past.  Brain MRI with and without contrast in August 2020 reviewed and it was reported as essentially normal brain MRI.   Suggested to get an updated eye exam   Offer to be seen at [facility]  visit to better define headaches and help with treatment plan/co-management and patient accepted.   Will adjust your treatment after your [facility] visit.     Follow-up after comprehensive headache clinic visit to review the plan      The longitudinal plan of care for headache for Dustin was addressed during this visit. Due to the added complexity in care, I will continue to support Dustin in the subsequent management of this condition(s) and with the ongoing continuity of care of this condition(s).    23 minutes spent on the date of the encounter doing face to face access, chart  review,  results review,  meds review, treatment plan, documentation and further activities as noted above    KEVIN Shaw, CNP [facility]    Subjective:    Dustin Rainey returns for follow up of headaches   Initial headache clinic visit on 3/11/2020, see note for details  Family history of headaches-grandmother would get headaches.  Headaches became regular in the past several years as reported in 2020.  Updates today:  Daily headaches typically 5/10 on the numeric pain scale in 30 days out of 30 days/month and severe migraines frequency 3-4 days /month    Right side most of the time and sometimes on the left.  Nausea and light sensitivity with migraine  headaches.  No new symptoms with headaches and consistent with typical headache symptoms.  No new changes in headaches since last brain MRI in 2020.   Able to sleep well most of the time.   Has a consistent sleep schedule  Exercise   Stress might be a trigger     Meds /treatment tried for headache management  Excedrin tried to stop it in May only for a day -1-2 pills /day and some day wakes up fine but than headache coming on in the afternoon and takes Excedrin and feels good. Tried to stop it and developed a severe headaches.   Emgality tried -was not very effective for 9 months. Headache tends to grow more severe and if does not stop it than headache is more severe.   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  General: Cooperative, NAD  Neurologic:  Mental Status: Fully alert, attentive and oriented. Speech clear and fluent.   Cranial Nerves: Facial movements symmetric.   Motor: No abnormal movements.      Pertinent Investigations:    Reviewed  Component  Ref Range & Units (hover) 1 mo ago     GFR Estimated (External) >60    Sodium (External) 141    Potassium (External) 4.4    Chloride (External) 106    CO2 (External) 28    Glucose (External) 104    Urea Nitrogen (External) 15.4    Creatinine  (External) 0.87    Calcium (External) 9.3     Reviewed  MR BRAIN W/O & W CONTRAST 8/26/2020 12:57 PM     Provided History: worsening headache; Worsening headaches.  ICD-10: Worsening headaches     Comparison: None.     Technique: Multiplanar T1-weighted, axial FLAIR, and susceptibility  images were obtained without intravenous contrast. Following  intravenous gadolinium-based contrast administration, axial  T2-weighted, diffusion, and T1-weighted images (in multiple planes)  were obtained.     Contrast: 7.5 mL Gadavist      Findings:  There is no mass effect, midline shift, or evidence of intracranial  hemorrhage. The ventricles are proportionate to the cerebral sulci.  Normal major vascular intracranial flow-voids. Small right  temporo-occipital DVA.     Postcontrast images demonstrate no abnormal intracranial enhancement.     No abnormality of the skull marrow signal. The visualized portions of  paranasal sinuses, and mastoid air cells are relatively clear. The  orbits are grossly unremarkable.                                                                      Impression: Essentially normal brain MRI      I have personally reviewed the examination and initial interpretation  and I agree with the findings.     EMBER CARVAJAL MD        7/20/2022     6:21 PM 2/9/2024    10:45 AM 6/10/2025     8:20 AM   HIT-6   When you have headaches, how often is the pain severe 10 10 10   How often do headaches limit your ability to do usual daily activities including household work, work, school, or social activities? 11 11 11   When you have a headache, how often do you wish you could lie down? 10 11 11   In the past 4 weeks, how often have you felt too tired to do work or daily activities because of your headaches 10 10 11   In the past 4 weeks, how often have you felt fed up or irritated because of your headaches 11 10 10   In the past 4 weeks, how often did headaches limit your ability to concentrate on work or daily  activities 11 11 11   HIT-6 Total Score 63 63 64        Patient-reported           7/22/2022     8:54 AM 2/9/2024    10:48 AM 6/10/2025     8:22 AM   MIDAS - in the past three months:   On how many days did you miss work or school because of your headaches? 3 5 5   How many days was your productivity at work or school reduced by half or more because of your headaches? 45 60 45   On how many days did you not do household work because of your headaches? 5 10 10   How many days was your productivity in household work reduced by half or more because of your headaches? 30 20 10   On how many days did you miss family, social, or leisure activities because of your headaches? 2 10 5   On how many days did you have a headache? 75 90 85   On a scale of 0-10, on average how painful were these headaches? 6 5 5   MIDAS Score 85 (IV - Severe Disability) 105 (IV - Severe Disability) 75 (IV - Severe Disability)

## 2025-06-14 ENCOUNTER — OFFICE VISIT (OUTPATIENT)
Dept: CARDIOLOGY | Facility: CLINIC | Age: 40
End: 2025-06-14
Attending: INTERNAL MEDICINE
Payer: COMMERCIAL

## 2025-06-14 ENCOUNTER — LAB (OUTPATIENT)
Dept: LAB | Facility: CLINIC | Age: 40
End: 2025-06-14
Payer: COMMERCIAL

## 2025-06-14 VITALS
SYSTOLIC BLOOD PRESSURE: 128 MMHG | BODY MASS INDEX: 32.23 KG/M2 | WEIGHT: 231.1 LBS | HEART RATE: 93 BPM | OXYGEN SATURATION: 97 % | DIASTOLIC BLOOD PRESSURE: 83 MMHG

## 2025-06-14 DIAGNOSIS — R07.9 CHEST PAIN: ICD-10-CM

## 2025-06-14 DIAGNOSIS — R63.5 WEIGHT GAIN: Primary | ICD-10-CM

## 2025-06-14 DIAGNOSIS — R63.5 WEIGHT GAIN: ICD-10-CM

## 2025-06-14 LAB — TSH SERPL DL<=0.005 MIU/L-ACNC: 3.32 UIU/ML (ref 0.3–4.2)

## 2025-06-14 PROCEDURE — 36415 COLL VENOUS BLD VENIPUNCTURE: CPT | Performed by: PATHOLOGY

## 2025-06-14 PROCEDURE — 1126F AMNT PAIN NOTED NONE PRSNT: CPT | Performed by: INTERNAL MEDICINE

## 2025-06-14 PROCEDURE — 99213 OFFICE O/P EST LOW 20 MIN: CPT | Performed by: INTERNAL MEDICINE

## 2025-06-14 PROCEDURE — 3074F SYST BP LT 130 MM HG: CPT | Performed by: INTERNAL MEDICINE

## 2025-06-14 PROCEDURE — 3079F DIAST BP 80-89 MM HG: CPT | Performed by: INTERNAL MEDICINE

## 2025-06-14 PROCEDURE — 99204 OFFICE O/P NEW MOD 45 MIN: CPT | Performed by: INTERNAL MEDICINE

## 2025-06-14 PROCEDURE — 84443 ASSAY THYROID STIM HORMONE: CPT | Performed by: PATHOLOGY

## 2025-06-14 PROCEDURE — 93005 ELECTROCARDIOGRAM TRACING: CPT

## 2025-06-14 RX ORDER — CHOLECALCIFEROL (VITAMIN D3) 50 MCG
1 TABLET ORAL DAILY
COMMUNITY
Start: 2025-06-12

## 2025-06-14 ASSESSMENT — PAIN SCALES - GENERAL: PAINLEVEL_OUTOF10: NO PAIN (0)

## 2025-06-14 NOTE — LETTER
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  He is on Nurtec now.  Cholesterol is within normal limits.  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    CURRENT MEDICATIONS:  Current Outpatient Medications   Medication Sig Dispense Refill     aspirin-acetaminophen-caffeine (EXCEDRIN MIGRAINE) 250-250-65 MG tablet Take 1 tablet by mouth daily as needed for headaches.       ondansetron (ZOFRAN ODT) 4 MG ODT tab DISSOLVE ONE TABLET in MOUTH EVERY 8 HOURS AS NEEDED FOR NAUSEA 20 tablet 0     rimegepant (NURTEC) 75 MG ODT tablet Place 1 tablet (75 mg) under the tongue every 48 hours. 16 tablet 11     rizatriptan (MAXALT-MLT) 5 MG ODT Take 1-2 tablets (5-10 mg) by mouth at onset of headache for migraine (may repeat in 2 hours as needed. Max 30 mg in 24 hours). 18 tablet 9     vitamin D3 (CHOLECALCIFEROL) 50 mcg (2000 units) tablet          PAST SURGICAL HISTORY:  No past surgical history on file.    ALLERGIES  Gramineae pollens, Peanut (diagnostic), Peanut-containing drug products, and Seasonal allergies    FAMILY HX:  No family history on file.    SOCIAL HX:  Social History     Socioeconomic History     Marital status: Single     Spouse name: None     Number of children: None     Years of education: None     Highest education level: None   Tobacco Use     Smoking status: Never     Smokeless tobacco: Never   Vaping Use     Vaping status: Never Used     Social Drivers of Health     Financial Resource Strain: Low Risk  (12/19/2022)    Received from [facility]    Overall Financial Resource Strain (CARDIA)      Difficulty of Paying Living Expenses: Not hard at all   Food Insecurity: No Food Insecurity (12/19/2022)    Received from [facility]    Hunger Vital Sign      Worried About Running Out of Food in the Last Year: Never true      Ran Out of Food in the Last Year: Never  true   Transportation Needs: No Transportation Needs (12/19/2022)    Received from [facility]    PRAPARE - Transportation      Lack of Transportation (Medical): No      Lack of Transportation (Non-Medical): No   Physical Activity: Sufficiently Active (12/19/2022)    Received from [facility]    Exercise Vital Sign      Days of Exercise per Week: 6 days      Minutes of Exercise per Session: 60 min   Stress: Stress Concern Present (12/19/2022)    Received from [facility]    [place] [employer] - Occupational Stress Questionnaire      Feeling of Stress : To some extent   Social Connections: Moderately Isolated (12/19/2022)    Received from [facility]    Social Connection and Isolation Panel [NHANES]      Frequency of Communication with Friends and Family: Once a week      Frequency of Social Gatherings with Friends and Family: Twice a week      Attends Restorationism Services: Never      Active Member of Clubs or Organizations: Yes      Attends Club or Organization Meetings: 1 to 4 times per year      Marital Status: Never    Interpersonal Safety: Not At Risk (12/19/2022)    Received from [facility]    Humiliation, Afraid, Rape, and Kick questionnaire      Fear of Current or Ex-Partner: No      Emotionally Abused: No      Physically Abused: No      Sexually Abused: No   Housing Stability: Low Risk  (12/19/2022)    Received from [facility]    Housing Stability Vital Sign      Unable to Pay for Housing in the Last Year: No      Number of Places Lived in the Last Year: 2      Unstable Housing in the Last Year: No       ROS:  Constitutional: No fever, chills, or sweats. No weight gain/loss.   ENT: No visual disturbance, ear ache, epistaxis, sore throat.   Allergies/Immunologic: Negative.   Respiratory: No cough, hemoptysis.   Cardiovascular: As per HPI.   GI: No nausea, vomiting, hematemesis, melena, or hematochezia.   : No urinary frequency, dysuria, or hematuria.   Integument: Negative.    Psychiatric: Negative.   Neuro: Negative.   Endocrinology: Negative.   Musculoskeletal: No myalgia.    VITAL SIGNS:  /83 (BP Location: Right arm, Patient Position: Chair, Cuff Size: Adult Large)   Pulse 93   Wt 104.8 kg (231 lb 1.6 oz)   SpO2 97%   BMI 32.23 kg/m    Body mass index is 32.23 kg/m .  Wt Readings from Last 2 Encounters:   06/14/25 104.8 kg (231 lb 1.6 oz)   02/09/24 86.2 kg (190 lb)       PHYSICAL EXAM  Dustin Rainey IS A 40 year old male.in no acute distress.  HEENT: Unremarkable.  Neck: JVP normal.  .  Lungs: CTA.  Cor: RRR. Normal S1 and S2.  No murmur  Abd: Soft   Extremities: No C/C/E.   Neuro: Grossly intact.      Recent Labs   Lab Test 05/09/25  1434   CHLORIDE 106     Recent Labs   Lab Test 05/09/25  1434   TRIG 223*        ASSESSMENT AND PLAN:  #1 Constant chest tightness, atypical, with normal stress test and structurally normal heart  #2 BMI 32 with 40 lb weight gain in the past year  #3 Migraine headaches seeing neurology    It was a pleasure to be involved in the care of Mr. Rainey.  I reviewed his history and symptoms in detail and testing as outlined.  I reviewed his echo images personally as well.  He has a structurally normal heart with no concerning findings on his exercise stress echo.  His chest tightness was constant and does not change at all with exercise, suggesting a noncardiac etiology for his symptoms.  He did have pulmonary function test with some obstruction but did not respond to pulmonary dilators.  We discussed the role of stress and anxiety and how that can cause these symptoms, but it is a diagnosis made by exclusion.      We discussed his weight gain and  the importance of a good diet and exercise for long-term health.  Will check a TSH to be certain hypothyroidism is not playing a role.    Recommend follow-up with his primary physician.  Does not need cardiology follow-up at this time, but we would be happy to see him back with any additional  concerns.     He understands and is in agreement with the plan as outlined.  It was a pleasure to be involved in his care.  Please do not hesitate to contact me with questions or concerns.    SHAWNA Cornelius MD   48 minutes face-face, documentation and review of records on day of visit    Please do not hesitate to contact me if you have any questions/concerns.     Sincerely,     Yogesh Cornelius MD

## 2025-06-14 NOTE — NURSING NOTE
Chief Complaint   Patient presents with    New Patient      NEW CARDIOLOGY - Chest pain per pt       Vitals were taken, medications reconciled and EKG performed.    Ken Mulligan, Clinic Assistant     9:33 AM

## 2025-06-14 NOTE — Clinical Note
6/14/2025      RE: Dustin Rainey  [address] [place] [facility] [address]       Dear Colleague,    Thank you for the opportunity to participate in the care of your patient, Dustin Rainey, at the [facility] [place] [facility]. Please see a copy of my visit note below.    No notes on file    Please do not hesitate to contact me if you have any questions/concerns.     Sincerely,     Yogesh Cornelius MD

## 2025-06-17 DIAGNOSIS — G43.709 CHRONIC MIGRAINE WITHOUT AURA WITHOUT STATUS MIGRAINOSUS, NOT INTRACTABLE: Primary | ICD-10-CM

## 2025-06-17 LAB
ATRIAL RATE - MUSE: 87 BPM
DIASTOLIC BLOOD PRESSURE - MUSE: NORMAL MMHG
INTERPRETATION ECG - MUSE: NORMAL
P AXIS - MUSE: 51 DEGREES
PR INTERVAL - MUSE: 160 MS
QRS DURATION - MUSE: 88 MS
QT - MUSE: 346 MS
QTC - MUSE: 416 MS
R AXIS - MUSE: -1 DEGREES
SYSTOLIC BLOOD PRESSURE - MUSE: NORMAL MMHG
T AXIS - MUSE: 28 DEGREES
VENTRICULAR RATE- MUSE: 87 BPM

## 2025-06-17 NOTE — PROGRESS NOTES
Medication Therapy Management (MTM) Encounter    ASSESSMENT:                            Medication Adherence/Access: No issues identified.    Headache/Migraine  Nurtec has been somewhat helpful in reducing migraine severity and frequency but patient is still experiencing daily headaches, likely at least in part to medication overuse (Excedrin and caffeine). His goal is to wean off of Excedrin and he may benefit from a short course of steroids to aid in weaning the Excedrin. As for other preventive therapies that could be considered, I would recommend: Depakote, venlafaxine/duloxetine, Atenolol, Botox, or alternative CGRP inhibitors. As for acute treatment, rizatriptan has been effective but he usually takes 2 tablets at a time so I would recommend switching his dosage to 10 mg tablets. He would also benefit from having an anti-nausea medication on hand. Finally, he mentioned that muscle relaxers were helpful in the past so this could be considered in the future, if needed, especially as he is weaning off the Excedrin.      Supplements   Stable     PLAN:                            We discussed some approaches to weaning off of Excedrin and I think the best approach in your case would be to start a short course of steroids while discontinuing the Excedrin (and limiting total caffeine/day to 200 mg).  There are other preventive medications available if weaning off the Excedrin does not improve your headache/migraine frequency/severity. In the meantime, continue Nurtec as it seems to be somewhat helpful.   As for your acute treatments, rizatriptan is working well but you may benefit from having the 10 mg tablets available for better efficacy. I will also have your provider refill the anti-nausea medication for you.     Follow-up: only as needed    SUBJECTIVE/OBJECTIVE:                          Dustin Rainey is a 40 year old male seen for an initial visit. He was referred to me from Yolis Linn CNP.      Reason  for visit: seen in comprehensive headache clinic    Allergies/ADRs: Reviewed in chart  Past Medical History: Reviewed in chart  Tobacco: He reports that he has never smoked. He has never used smokeless tobacco.  Alcohol: not currently using  THC/CBD: none  Caffeine: drinks 1 pot of coffee per day and occasional soda    Medication Adherence/Access: no issues reported.    Headache   Migraine:   Preventive medications  - Nurtec 75 mg every other day   Acute medications  - Excedrin 1 tablet 1-2 times daily (most often 2/day)  - Rizatriptan 5 mg ODT, typically takes 2 tablets (10 mg) at a time, about once a week  - ondansetron 4 mg ODT - hasn't taken recently     Patient states that he has daily headaches. He takes Excedrin to control the headaches and reports that the pain is usually manageable. However, he is concerned about being on Excedrin long term. He is also limiting use of rizatriptan to avoid rebound effect. When he has a migraine attack he can get nauseous but states he hasn't needed the antiemetic recently. He has tried weaning off Excedrin several times in the past. Most recent attempt was in May: the first day went fine without it but second day he woke up with a terrible migraine and vomiting. He does not recall a stretch of time being off analgesics completely - he has taken either Excedrin or ibuprofen daily for years. His goal would be to go off of Excedrin and find other ways of managing the headaches. He has tried stopping caffeine from [id number] and did not find that his headaches improved so he returned to using caffeine.     Nurtec has been helpful in reducing migraine frequency and severity.  He also recalls that a muscle relaxer he took in the past was somewhat helpful for sleep and headache but he was told to continue it in the long-term.      Previous medications tried  Emgality- tried for 9 months; maybe somewhat helpful but had some GI side effects (bloating and constipation)   Amitriptyline-  caused daytime tiredness/lack of energy and did not help headaches   Nortriptyline- in 2018 and not effective  Propranolol- not effective   Eletriptan- stopped working   Sumatriptan-stopped working   Topiramate- stopped working? He doesn't recall details   Gabapentin- velarde snot recall details   Muscle relaxer- somewhat helpful      Supplements   - vitamin D3 2000 units  Recently started this supplement based on low vitamin D level        Today's Vitals: There were no vitals taken for this visit.  ----------------    I spent 16 minutes with this patient today. I offer these suggestions for consideration by Yolis Linn CNP.     A summary of these recommendations was sent via [employer].    Erica Nix, Pharm.D.  Medication Therapy Management Pharmacist  [facility]    Telemedicine Visit Details  The patient's medications can be safely assessed via a telemedicine encounter.  Type of service:  Video Conference via [address]  Originating Location (pt. Location): Home    Distant Location (provider location):  Off-site  Start Time: 1:38 PM  End Time: 1:54 PM     Medication Therapy Recommendations  No medication therapy recommendations to display

## 2025-06-18 ENCOUNTER — VIRTUAL VISIT (OUTPATIENT)
Dept: PHYSICAL MEDICINE AND REHAB | Facility: CLINIC | Age: 40
End: 2025-06-18
Payer: COMMERCIAL

## 2025-06-18 ENCOUNTER — VIRTUAL VISIT (OUTPATIENT)
Dept: PHARMACY | Facility: CLINIC | Age: 40
End: 2025-06-18
Attending: NURSE PRACTITIONER
Payer: COMMERCIAL

## 2025-06-18 ENCOUNTER — VIRTUAL VISIT (OUTPATIENT)
Dept: NEUROLOGY | Facility: CLINIC | Age: 40
End: 2025-06-18
Payer: COMMERCIAL

## 2025-06-18 DIAGNOSIS — G43.909 MIGRAINE WITHOUT STATUS MIGRAINOSUS, NOT INTRACTABLE, UNSPECIFIED MIGRAINE TYPE: Primary | ICD-10-CM

## 2025-06-18 DIAGNOSIS — G43.709 CHRONIC MIGRAINE WITHOUT AURA WITHOUT STATUS MIGRAINOSUS, NOT INTRACTABLE: Primary | ICD-10-CM

## 2025-06-18 DIAGNOSIS — M54.2 NECK PAIN: ICD-10-CM

## 2025-06-18 DIAGNOSIS — Z78.9 TAKES DIETARY SUPPLEMENTS: ICD-10-CM

## 2025-06-18 DIAGNOSIS — R29.898 NECK TIGHTNESS: ICD-10-CM

## 2025-06-18 DIAGNOSIS — G43.709 CHRONIC MIGRAINE W/O AURA W/O STATUS MIGRAINOSUS, NOT INTRACTABLE: Primary | ICD-10-CM

## 2025-06-18 PROCEDURE — 98002 SYNCH AUDIO-VIDEO NEW MOD 45: CPT | Performed by: PHYSICAL MEDICINE & REHABILITATION

## 2025-06-18 PROCEDURE — 1126F AMNT PAIN NOTED NONE PRSNT: CPT | Mod: 95 | Performed by: PHYSICAL MEDICINE & REHABILITATION

## 2025-06-18 ASSESSMENT — ACTIVITIES OF DAILY LIVING (ADL): I_KEEP_THINKING_ABOUT_HOW_BADLY_I_WANT_THE_PAIN_TO_STOP: 2 = TO A MODERATE DEGREE

## 2025-06-18 ASSESSMENT — ANXIETY QUESTIONNAIRES
7. FEELING AFRAID AS IF SOMETHING AWFUL MIGHT HAPPEN: NOT AT ALL
GAD7 TOTAL SCORE: 0
7. FEELING AFRAID AS IF SOMETHING AWFUL MIGHT HAPPEN: NOT AT ALL
8. IF YOU CHECKED OFF ANY PROBLEMS, HOW DIFFICULT HAVE THESE MADE IT FOR YOU TO DO YOUR WORK, TAKE CARE OF THINGS AT HOME, OR GET ALONG WITH OTHER PEOPLE?: NOT DIFFICULT AT ALL
IF YOU CHECKED OFF ANY PROBLEMS ON THIS QUESTIONNAIRE, HOW DIFFICULT HAVE THESE PROBLEMS MADE IT FOR YOU TO DO YOUR WORK, TAKE CARE OF THINGS AT HOME, OR GET ALONG WITH OTHER PEOPLE: NOT DIFFICULT AT ALL
2. NOT BEING ABLE TO STOP OR CONTROL WORRYING: NOT AT ALL
GAD7 TOTAL SCORE: 0
1. FEELING NERVOUS, ANXIOUS, OR ON EDGE: NOT AT ALL
4. TROUBLE RELAXING: NOT AT ALL
6. BECOMING EASILY ANNOYED OR IRRITABLE: NOT AT ALL
3. WORRYING TOO MUCH ABOUT DIFFERENT THINGS: NOT AT ALL
5. BEING SO RESTLESS THAT IT IS HARD TO SIT STILL: NOT AT ALL
7. FEELING AFRAID AS IF SOMETHING AWFUL MIGHT HAPPEN: NOT AT ALL
4. TROUBLE RELAXING: NOT AT ALL
2. NOT BEING ABLE TO STOP OR CONTROL WORRYING: NOT AT ALL
IF YOU CHECKED OFF ANY PROBLEMS ON THIS QUESTIONNAIRE, HOW DIFFICULT HAVE THESE PROBLEMS MADE IT FOR YOU TO DO YOUR WORK, TAKE CARE OF THINGS AT HOME, OR GET ALONG WITH OTHER PEOPLE: NOT DIFFICULT AT ALL
1. FEELING NERVOUS, ANXIOUS, OR ON EDGE: NOT AT ALL
GAD7 TOTAL SCORE: 0
6. BECOMING EASILY ANNOYED OR IRRITABLE: NOT AT ALL
5. BEING SO RESTLESS THAT IT IS HARD TO SIT STILL: NOT AT ALL
3. WORRYING TOO MUCH ABOUT DIFFERENT THINGS: NOT AT ALL
GAD7 TOTAL SCORE: 0

## 2025-06-18 ASSESSMENT — HEADACHE IMPACT TEST (HIT 6)
HIT6 TOTAL SCORE: 64
WHEN YOU HAVE HEADACHES HOW OFTEN IS THE PAIN SEVERE: SOMETIMES
WHEN YOU HAVE A HEADACHE HOW OFTEN DO YOU WISH YOU COULD LIE DOWN: VERY OFTEN
HOW OFTEN DO HEADACHES LIMIT YOUR DAILY ACTIVITIES: SOMETIMES
HOW OFTEN HAVE YOU FELT TOO TIRED TO WORK BECAUSE OF YOUR HEADACHES: VERY OFTEN
HOW OFTEN HAVE YOU FELT FED UP OR IRRITATED BECAUSE OF YOUR HEADACHES: VERY OFTEN
HOW OFTEN DID HEADACHS LIMIT CONCENTRATION ON WORK OR DAILY ACTIVITY: VERY OFTEN

## 2025-06-18 NOTE — LETTER
6/18/2025       RE: Dustin Rainey  [address]  [place] [facility] [address]     Dear Colleague,    Thank you for referring your patient, Dustin Rainey, to the [facility] [place] [facility]. Please see a copy of my visit note below.    Virtual Visit Details    Type of service:  Video Visit     Originating Location (pt. Location): Home    Distant Location (provider location):  Off-site  Platform used for Video Visit: [employer]  Answers submitted by the patient for this visit:  Patient Health Questionnaire (G7) (Submitted on 6/18/2025)  MERCEDES 7 TOTAL SCORE: 0      [facility]    Headache Neurology Multidisciplinary Headache Clinic  June 18, 2025      Assessment/Plan:   Dustin Rainey is a 40 year old who presents to the multidisciplinary headache clinic for further evaluation. Referred by Yolis Linn NP.    Chronic migraine without aura  Possible medication overuse headache, Excedrin/caffeine    Additional/updated imaging recommended due to worsening of symptoms since 2020, new left arm and leg sensory changes:  -MRI brain wo and w contrast  -MRI cervical spine wo and w contrast  -neck pain reviewed further with PM&R, see Dr David's note    Possible medication overuse headache:  -Excedrin off x at least 2 weeks, if restarted, limit to 9 days per month or less  -limit caffeine to less than 200 mg total daily in the future  -Steroid taper during this time, muscle relaxers  -Ok to continue rizatriptan 10 mg (increased dose)    Migraine prevention:  -Continue Nurtec 75 mg ODT every other day  -Could consider venlafaxine, atenolol, verapamil, Depakote, Botox, alternative CGRP inhibitors as alternative preventive options  -Could consider Nerivio or other device, meditation, yoga, progressive relaxation, acupuncture, CBT, routines around sleep and eating    I spent over 40 minutes on record review, patient  care, coordination of care, and documentation on the day of the visit.    Lauren Cross MD  Neurology     Subjective:    Dustin Rainey who presents for further evaluation of headaches.    He's had headaches since at least 2012. They became daily around 2016. No clear provoking factors. Gradual worsening over years. Overall, it has worsened, with ebbing and flowing; some stable periods.     Headache generally aren't present in the morning. Starts with pressure on the right side behind his right eye. Rarely on left side. It is described as growing from light to worsening throughout the day to be severe. Feels like head is in a vice, throbbing when severe. Severe pain lasts all day. Rates 7/10 on a bad day, 4-5/10 at baseline. Improves if he takes medication.     Associated with nausea, vomiting, photophobia, phonophobia.    Denies typical aura. Denies a positional component, but prefers to apply pressure to his eye. Denies unilateral autonomic features. Denies fevers or other illness associated.     Headache occurs 30/30 days per month, with 8/30 severe headache days per month.    Treating with Nurtec every other day for prevention, which helps migraine. Also has rizatriptan, which he takes once a week (taking 10 mg as needed).     Takes Excedrin daily as needed. Caffeine/coffee also helps.    Meds /treatment tried for headache management  Excedrin tried to stop it in May only for a day -1-2 pills /day and some day wakes up fine but than headache coming on in the afternoon and takes Excedrin and feels good. Tried to stop it and developed a severe headaches.   Emgality tried -was not very effective for 9 months. Headache tends to grow more severe and if does not stop it than headache is more severe.   Amitriptyline caused tiredness feeling in the higher dose and did not help with headache, a profound effect and lack of energy when amitriptyline dose was increased to 30 mg  Nortriptyline in 2018 and did not work    Propranolol -no relief with headaches  Rizatriptan-currently and works most of the time  Avril triptan  Sumatriptan-stopped working   Topiramate-stopped working  Gabapentin  Nurtec every other day -no difference    Tried sowmya, which may have helped a little  Has Cefaly device    Sometimes gets a sensation of numbness, weakness, tingling in left arm and leg at the same time. Lasts days to weeks. Not provoked. Late last summer onset.     Right knee john when walking sometimes.  Abdominal pain that is unexplained; CT scan was unrevealing he reports.     Objective:    Vitals: There were no vitals taken for this visit.  General: Cooperative, NAD  Neurologic:  Mental Status: Fully alert, attentive and oriented. Speech clear and fluent.   Cranial Nerves: Facial movements symmetric.   Motor: No abnormal movements.      Pertinent Investigations:    MRI wo and w contrast (8/26/2020):  There is no mass effect, midline shift, or evidence of intracranial  hemorrhage. The ventricles are proportionate to the cerebral sulci.  Normal major vascular intracranial flow-voids. Small right  temporo-occipital DVA.  Postcontrast images demonstrate no abnormal intracranial enhancement.    Eye exam UTD - no concerns    EKG, echo, and TSH wnl.    Patient reported labs:  Hgb 16.9 (wnl per lab)  Vit B12 425 (wnl per lab)        2/9/2024    10:45 AM 6/10/2025     8:20 AM 6/18/2025    12:15 PM   HIT-6   When you have headaches, how often is the pain severe 10 10 10   How often do headaches limit your ability to do usual daily activities including household work, work, school, or social activities? 11 11 10   When you have a headache, how often do you wish you could lie down? 11 11 11   In the past 4 weeks, how often have you felt too tired to do work or daily activities because of your headaches 10 11 11   In the past 4 weeks, how often have you felt fed up or irritated because of your headaches 10 10 11   In the past 4 weeks, how often did  headaches limit your ability to concentrate on work or daily activities 11 11 11   HIT-6 Total Score 63 64  64        Patient-reported           7/22/2022     8:54 AM 2/9/2024    10:48 AM 6/10/2025     8:22 AM   MIDAS - in the past three months:   On how many days did you miss work or school because of your headaches? 3 5 5   How many days was your productivity at work or school reduced by half or more because of your headaches? 45 60 45   On how many days did you not do household work because of your headaches? 5 10 10   How many days was your productivity in household work reduced by half or more because of your headaches? 30 20 10   On how many days did you miss family, social, or leisure activities because of your headaches? 2 10 5   On how many days did you have a headache? 75 90 85   On a scale of 0-10, on average how painful were these headaches? 6 5 5   MIDAS Score 85 (IV - Severe Disability) 105 (IV - Severe Disability) 75 (IV - Severe Disability)          Again, thank you for allowing me to participate in the care of your patient.      Sincerely,    Lauren Cross MD

## 2025-06-18 NOTE — PROGRESS NOTES
PM&R Clinic Note     Patient Name: Dustin Rainey : 1985 Medical Record: [id number]          History of Present Illness:     Dustin Rainey is a 40 year old male who was seen and evaluated at our multidisciplinary headache clinic today.     He is followed by KEVIN Shaw, CNP for chronic daily headaches and chronic migraines. He unfortunately continues to have daily headaches despite trial of multiple meds and interventions.     Symptoms,  Please refer to Dr. Cross s note for more details on his headache history. During our visit, we focused on musculoskeletal and spine-related issues as potential contributing factors.    He has had tightness in his neck muscles for a while. He also often cracks his neck to right and left with some relief. This doesn't seem to be related to his migraine or daily headaches.     He gets neck pain during his migraine attacks. It's a dull achy pain located at posterior and lateral aspects of his neck with no radiation to bilateral upper extremities.  It's hard to say but he doesn't think neck pain is a trigger for his headaches. He tends to rub his neck while he has migraine and neck pain which is often beneficial.     Since late last summer / early fall, he has had intermittent episodes of numbness and tingling in his left UE and LE. Symptoms involve below elbow and below knee and may last for a few days or longer up to 2-3 weeks. No specific trigger and no inciting event when these episodes started. He has some sense of weakness associated with these episodes in his LUE and LLE but can still use his arm and leg and function. No changes in his bowel or bladder function. He has had more looser stool which was evaluated by GI team and it was recommended to take more fiber. No incontinent episodes.     He has h/o chronic chest pain/chest wall pain that was evaluated by cardiology and was not concerning.     He has been trying to have very consistent and  regular sleep hours as well as regular meals. His goal is to have consistent schedule and not trigger migraines.       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  No past surgical history on file.         Social History:     Social History     Tobacco Use    Smoking status: Never    Smokeless tobacco: Never   Substance Use Topics    Alcohol use: Not on file              Family History:     No family history on file.         Medications:     Current Outpatient Medications   Medication Sig Dispense Refill    aspirin-acetaminophen-caffeine (EXCEDRIN MIGRAINE) 250-250-65 MG tablet Take 1 tablet by mouth daily as needed for headaches.      ondansetron (ZOFRAN ODT) 4 MG ODT tab DISSOLVE ONE TABLET in MOUTH EVERY 8 HOURS AS NEEDED FOR NAUSEA 20 tablet 0    rimegepant (NURTEC) 75 MG ODT tablet Place 1 tablet (75 mg) under the tongue every 48 hours. 16 tablet 11    rizatriptan (MAXALT-MLT) 5 MG ODT Take 1-2 tablets (5-10 mg) by mouth at onset of headache for migraine (may repeat in 2 hours as needed. Max 30 mg in 24 hours). 18 tablet 9  "   vitamin D3 (CHOLECALCIFEROL) 50 mcg (2000 units) tablet               Allergies:     Allergies   Allergen Reactions    Gramineae Pollens Other (See Comments)    Peanut (Diagnostic) Hives and Other (See Comments)     Other reaction(s): Hives   One episode of hives eating peanuts out of shell as a child.    One episode of hives eating peanuts out of shell as a child.    One episode of hives eating peanuts out of shell as a child.    Peanut-Containing Drug Products      Other reaction(s): Hives  One episode of hives eating peanuts out of shell as a child.     Seasonal Allergies               ROS:     A focused ROS is negative other than the symptoms noted above in the HPI.           Physical Examiniation:     VITAL SIGNS: There were no vitals taken for this visit.  BMI: Estimated body mass index is 32.23 kg/m  as calculated from the following:    Height as of 2/9/24: 1.803 m (5' 11\").    Weight as of 6/14/25: 104.8 kg (231 lb 1.6 oz).    Video visit              Laboratory/Imaging:     MR BRAIN W/O & W CONTRAST 8/26/2020 12:57 PM     Provided History: worsening headache; Worsening headaches.  ICD-10: Worsening headaches     Comparison: None.     Technique: Multiplanar T1-weighted, axial FLAIR, and susceptibility  images were obtained without intravenous contrast. Following  intravenous gadolinium-based contrast administration, axial  T2-weighted, diffusion, and T1-weighted images (in multiple planes)  were obtained.     Contrast: 7.5 mL Gadavist      Findings:  There is no mass effect, midline shift, or evidence of intracranial  hemorrhage. The ventricles are proportionate to the cerebral sulci.  Normal major vascular intracranial flow-voids. Small right  temporo-occipital DVA.     Postcontrast images demonstrate no abnormal intracranial enhancement.     No abnormality of the skull marrow signal. The visualized portions of  paranasal sinuses, and mastoid air cells are relatively clear. The  orbits are grossly " unremarkable.                                                                      Impression: Essentially normal brain MRI            Assessment/Plan:     Chronic migraine without aura and associated with neck pain   Possible medication overuse headache, Excedrin/caffeine  Chronic neck tightness - unrelated to headaches       Neck pain and tightness seem to be myofascial in nature with no clinical s/s of cervical radiculopathy or myelopathy. Facet-mediated pain may be contributing, but he reports pain only during headaches; otherwise, he primarily describes a sensation of tightness. No cervicogenic headaches either.     I am not sure about the etiology of paresthesia and weakness in his LUE and LLE; will start evaluation by getting brain and C spine MRI and monitor closely.       Patient education: In depth discussion and education was provided about the assessment and implications of each of the below recommendations for management. Patient indicated readiness to learn, all questions were answered and understanding of material presented was confirmed.    Work-up: brain and C spine MRI without contrast. Also recommend scoliosis screening xray at the [facility] [place]. Consider NCS/EMG of LUE and LLE pending his course.     Therapy/equipment/braces: recommend PT with use of modalities to help with ROM, tightness and also to establish a HEP for core strengthening. Can also consider OTN options for TENS if he is not interested in PT. Also recommended ergonomic assessment of his desk and work station.     Medications: continue topical tiger balm and other OTC topical -options. Can consider tizanidine as an oral option as needed for neck pain and tightness.      Interventions: consider TPIs in the future if his symptoms do not respond to PT, modalities and meds. If botox injections are considered for headache management, the sites could be adjusted to better control his neck pain.     Referral / follow up with other providers: none  today; consider spine referral pending scoliosis screening xray results and his response to the treatment options for neck tightness and pain     Follow up: as needed in the PM&R clinic     Pallavi David MD  Physical Medicine & Rehabilitation

## 2025-06-18 NOTE — NURSING NOTE
Current patient location: [address] [place] [id number]    Is the patient currently in the state of [place]? YES    Visit mode: VIDEO    If the visit is dropped, the patient can be reconnected by:TELEPHONE VISIT: Phone number:   Telephone Information:   Mobile [phone]       Will anyone else be joining the visit? NO  (If patient encounters technical issues they should call [phone])    Are changes needed to the allergy or medication list? Pt stated no med changes    Are refills needed on medications prescribed by this physician? NO    Rooming Documentation:  Questionnaire(s) completed    Reason for visit: Headache    Natasha SCHMITTF

## 2025-06-18 NOTE — LETTER
2025       RE: Dustin Rainey  [address] [facility] [address]     Dear Colleague,    Thank you for referring your patient, Dustin Rainey, to the [facility] [place] [facility]. Please see a copy of my visit note below.           PM&R Clinic Note     Patient Name: Dustin Rainey : 1985 Medical Record: [id number]          History of Present Illness:     Dustin Rainey is a 40 year old male who was seen and evaluated at our multidisciplinary headache clinic today.     He is followed by KEVIN Shaw, CNP for chronic daily headaches and chronic migraines. He unfortunately continues to have daily headaches despite trial of multiple meds and interventions.     Symptoms,  Please refer to Dr. Cross s note for more details on his headache history. During our visit, we focused on musculoskeletal and spine-related issues as potential contributing factors.    He has had tightness in his neck muscles for a while. He also often cracks his neck to right and left with some relief. This doesn't seem to be related to his migraine or daily headaches.     He gets neck pain during his migraine attacks. It's a dull achy pain located at posterior and lateral aspects of his neck with no radiation to bilateral upper extremities.  It's hard to say but he doesn't think neck pain is a trigger for his headaches. He tends to rub his neck while he has migraine and neck pain which is often beneficial.     Since late last summer / early fall, he has had intermittent episodes of numbness and tingling in his left UE and LE. Symptoms involve below elbow and below knee and may last for a few days or longer up to 2-3 weeks. No specific trigger and no inciting event when these episodes started. He has some sense of weakness associated with these episodes in his LUE and LLE but can still use his arm and  leg and function. No changes in his bowel or bladder function. He has had more looser stool which was evaluated by GI team and it was recommended to take more fiber. No incontinent episodes.     He has h/o chronic chest pain/chest wall pain that was evaluated by cardiology and was not concerning.     He has been trying to have very consistent and regular sleep hours as well as regular meals. His goal is to have consistent schedule and not trigger migraines.       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  No past surgical history on file.         Social History:     Social History     Tobacco Use     Smoking status: Never     Smokeless tobacco: Never   Substance Use Topics     Alcohol use: Not on file              Family History:     No family history on file.         Medications:     Current Outpatient Medications   Medication Sig Dispense Refill     aspirin-acetaminophen-caffeine (EXCEDRIN MIGRAINE) 250-250-65 MG tablet Take 1 tablet by mouth daily as needed for headaches.       ondansetron (ZOFRAN ODT) 4 MG ODT tab  "DISSOLVE ONE TABLET in MOUTH EVERY 8 HOURS AS NEEDED FOR NAUSEA 20 tablet 0     rimegepant (NURTEC) 75 MG ODT tablet Place 1 tablet (75 mg) under the tongue every 48 hours. 16 tablet 11     rizatriptan (MAXALT-MLT) 5 MG ODT Take 1-2 tablets (5-10 mg) by mouth at onset of headache for migraine (may repeat in 2 hours as needed. Max 30 mg in 24 hours). 18 tablet 9     vitamin D3 (CHOLECALCIFEROL) 50 mcg (2000 units) tablet               Allergies:     Allergies   Allergen Reactions     Gramineae Pollens Other (See Comments)     Peanut (Diagnostic) Hives and Other (See Comments)     Other reaction(s): Hives   One episode of hives eating peanuts out of shell as a child.    One episode of hives eating peanuts out of shell as a child.    One episode of hives eating peanuts out of shell as a child.     Peanut-Containing Drug Products      Other reaction(s): Hives  One episode of hives eating peanuts out of shell as a child.      Seasonal Allergies               ROS:     A focused ROS is negative other than the symptoms noted above in the HPI.           Physical Examiniation:     VITAL SIGNS: There were no vitals taken for this visit.  BMI: Estimated body mass index is 32.23 kg/m  as calculated from the following:    Height as of 2/9/24: 1.803 m (5' 11\").    Weight as of 6/14/25: 104.8 kg (231 lb 1.6 oz).    Video visit              Laboratory/Imaging:     MR BRAIN W/O & W CONTRAST 8/26/2020 12:57 PM     Provided History: worsening headache; Worsening headaches.  ICD-10: Worsening headaches     Comparison: None.     Technique: Multiplanar T1-weighted, axial FLAIR, and susceptibility  images were obtained without intravenous contrast. Following  intravenous gadolinium-based contrast administration, axial  T2-weighted, diffusion, and T1-weighted images (in multiple planes)  were obtained.     Contrast: 7.5 mL Gadavist      Findings:  There is no mass effect, midline shift, or evidence of intracranial  hemorrhage. The " ventricles are proportionate to the cerebral sulci.  Normal major vascular intracranial flow-voids. Small right  temporo-occipital DVA.     Postcontrast images demonstrate no abnormal intracranial enhancement.     No abnormality of the skull marrow signal. The visualized portions of  paranasal sinuses, and mastoid air cells are relatively clear. The  orbits are grossly unremarkable.                                                                      Impression: Essentially normal brain MRI            Assessment/Plan:     Chronic migraine without aura and associated with neck pain   Possible medication overuse headache, Excedrin/caffeine  Chronic neck tightness - unrelated to headaches       Neck pain and tightness seem to be myofascial in nature with no clinical s/s of cervical radiculopathy or myelopathy. Facet-mediated pain may be contributing, but he reports pain only during headaches; otherwise, he primarily describes a sensation of tightness. No cervicogenic headaches either.     I am not sure about the etiology of paresthesia and weakness in his LUE and LLE; will start evaluation by getting brain and C spine MRI and monitor closely.       Patient education: In depth discussion and education was provided about the assessment and implications of each of the below recommendations for management. Patient indicated readiness to learn, all questions were answered and understanding of material presented was confirmed.    Work-up: brain and C spine MRI without contrast. Also recommend scoliosis screening xray at the [facility]. Consider NCS/EMG of LUE and LLE pending his course.     Therapy/equipment/braces: recommend PT with use of modalities to help with ROM, tightness and also to establish a HEP for core strengthening. Can also consider OTN options for TENS if he is not interested in PT. Also recommended ergonomic assessment of his desk and work station.     Medications: continue topical tiger balm and other OTC topical  -options. Can consider tizanidine as an oral option as needed for neck pain and tightness.      Interventions: consider TPIs in the future if his symptoms do not respond to PT, modalities and meds. If botox injections are considered for headache management, the sites could be adjusted to better control his neck pain.     Referral / follow up with other providers: none today; consider spine referral pending scoliosis screening xray results and his response to the treatment options for neck tightness and pain     Follow up: as needed in the PM&R clinic     Pallavi David MD  Physical Medicine & Rehabilitation            Again, thank you for allowing me to participate in the care of your patient.      Sincerely,    Pallavi David MD

## 2025-06-18 NOTE — PROGRESS NOTES
[facility]    Headache Neurology Multidisciplinary Headache Clinic  June 18, 2025      Assessment/Plan:   Dustin Rainey is a 40 year old who presents to the multidisciplinary headache clinic for further evaluation. Referred by Yolis Linn NP.    Chronic migraine without aura  Possible medication overuse headache, Excedrin/caffeine    Additional/updated imaging recommended due to worsening of symptoms since 2020, new left arm and leg sensory changes:  -MRI brain wo and w contrast  -MRI cervical spine wo and w contrast  -neck pain reviewed further with PM&R, see Dr David's note    Possible medication overuse headache:  -Excedrin off x at least 2 weeks, if restarted, limit to 9 days per month or less  -limit caffeine to less than 200 mg total daily in the future  -Steroid taper during this time, muscle relaxers  -Ok to continue rizatriptan 10 mg (increased dose)    Migraine prevention:  -Continue Nurtec 75 mg ODT every other day  -Could consider venlafaxine, atenolol, verapamil, Depakote, Botox, alternative CGRP inhibitors as alternative preventive options  -Could consider Nerivio or other device, meditation, yoga, progressive relaxation, acupuncture, CBT, routines around sleep and eating    I spent over 40 minutes on record review, patient care, coordination of care, and documentation on the day of the visit.    Lauren Cross MD  Neurology     Subjective:    Dustin Rainey who presents for further evaluation of headaches.    He's had headaches since at least 2012. They became daily around 2016. No clear provoking factors. Gradual worsening over years. Overall, it has worsened, with ebbing and flowing; some stable periods.     Headache generally aren't present in the morning. Starts with pressure on the right side behind his right eye. Rarely on left side. It is described as growing from light to worsening throughout the day to be severe. Feels like head is in a vice,  throbbing when severe. Severe pain lasts all day. Rates 7/10 on a bad day, 4-5/10 at baseline. Improves if he takes medication.     Associated with nausea, vomiting, photophobia, phonophobia.    Denies typical aura. Denies a positional component, but prefers to apply pressure to his eye. Denies unilateral autonomic features. Denies fevers or other illness associated.     Headache occurs 30/30 days per month, with 8/30 severe headache days per month.    Treating with Nurtec every other day for prevention, which helps migraine. Also has rizatriptan, which he takes once a week (taking 10 mg as needed).     Takes Excedrin daily as needed. Caffeine/coffee also helps.    Meds /treatment tried for headache management  Excedrin tried to stop it in May only for a day -1-2 pills /day and some day wakes up fine but than headache coming on in the afternoon and takes Excedrin and feels good. Tried to stop it and developed a severe headaches.   Emgality tried -was not very effective for 9 months. Headache tends to grow more severe and if does not stop it than headache is more severe.   Amitriptyline caused tiredness feeling in the higher dose and did not help with headache, a profound effect and lack of energy when amitriptyline dose was increased to 30 mg  Nortriptyline in 2018 and did not work   Propranolol -no relief with headaches  Rizatriptan-currently and works most of the time  Avril triptan  Sumatriptan-stopped working   Topiramate-stopped working  Gabapentin  Nurtec every other day -no difference    Tried ginger, which may have helped a little  Has Cefaly device    Sometimes gets a sensation of numbness, weakness, tingling in left arm and leg at the same time. Lasts days to weeks. Not provoked. Late last summer onset.     Right knee john when walking sometimes.  Abdominal pain that is unexplained; CT scan was unrevealing he reports.     Objective:    Vitals: There were no vitals taken for this visit.  General:  Cooperative, NAD  Neurologic:  Mental Status: Fully alert, attentive and oriented. Speech clear and fluent.   Cranial Nerves: Facial movements symmetric.   Motor: No abnormal movements.      Pertinent Investigations:    MRI wo and w contrast (8/26/2020):  There is no mass effect, midline shift, or evidence of intracranial  hemorrhage. The ventricles are proportionate to the cerebral sulci.  Normal major vascular intracranial flow-voids. Small right  temporo-occipital DVA.  Postcontrast images demonstrate no abnormal intracranial enhancement.    Eye exam UTD - no concerns    EKG, echo, and TSH wnl.    Patient reported labs:  Hgb 16.9 (wnl per lab)  Vit B12 425 (wnl per lab)        2/9/2024    10:45 AM 6/10/2025     8:20 AM 6/18/2025    12:15 PM   HIT-6   When you have headaches, how often is the pain severe 10 10 10   How often do headaches limit your ability to do usual daily activities including household work, work, school, or social activities? 11 11 10   When you have a headache, how often do you wish you could lie down? 11 11 11   In the past 4 weeks, how often have you felt too tired to do work or daily activities because of your headaches 10 11 11   In the past 4 weeks, how often have you felt fed up or irritated because of your headaches 10 10 11   In the past 4 weeks, how often did headaches limit your ability to concentrate on work or daily activities 11 11 11   HIT-6 Total Score 63 64  64        Patient-reported           7/22/2022     8:54 AM 2/9/2024    10:48 AM 6/10/2025     8:22 AM   MIDAS - in the past three months:   On how many days did you miss work or school because of your headaches? 3 5 5   How many days was your productivity at work or school reduced by half or more because of your headaches? 45 60 45   On how many days did you not do household work because of your headaches? 5 10 10   How many days was your productivity in household work reduced by half or more because of your headaches? 30  20 10   On how many days did you miss family, social, or leisure activities because of your headaches? 2 10 5   On how many days did you have a headache? 75 90 85   On a scale of 0-10, on average how painful were these headaches? 6 5 5   MIDAS Score 85 (IV - Severe Disability) 105 (IV - Severe Disability) 75 (IV - Severe Disability)

## 2025-06-18 NOTE — PROGRESS NOTES
Virtual Visit Details    Type of service:  Video Visit     Originating Location (pt. Location): Home    Distant Location (provider location):  Off-site  Platform used for Video Visit: [employer]  Answers submitted by the patient for this visit:  Patient Health Questionnaire (G7) (Submitted on 6/18/2025)  MERCEDES 7 TOTAL SCORE: 0

## 2025-06-18 NOTE — PATIENT INSTRUCTIONS
"Recommendations from today's MTM visit:                                                    MTM (medication therapy management) is a service provided by a clinical pharmacist designed to help you get the most of out of your medicines.      We discussed some approaches to weaning off of Excedrin and I think the best approach in your case would be to start a short course of steroids while discontinuing the Excedrin (and limiting total caffeine/day to 200 mg).  There are other preventive medications available if weaning off the Excedrin does not improve your headache/migraine frequency/severity. In the meantime, continue Nurtec as it seems to be somewhat helpful.   As for your acute treatments, rizatriptan is working well but you may benefit from having the 10 mg tablets available for better efficacy. I will also have your provider refill the anti-nausea medication for you.     Follow-up: only as needed    It was great speaking with you today.  I value your experience and would be very thankful for your time in providing feedback in our clinic survey. In the next few days, you may receive an email or text message from [email or web address] with a link to a survey related to your  clinical pharmacist.\"     To schedule another MTM appointment, please call the clinic directly or you may call the MTM scheduling line at [phone].    My Clinical Pharmacist's contact information:                                                      Please feel free to contact me with any questions or concerns you have.      Erica Nix, Pharm.D.  Medication Therapy Management Pharmacist  [facility]     "

## 2025-06-18 NOTE — NURSING NOTE
Current patient location: [address] [place] [id number]    Is the patient currently in the state of [place]? YES    Visit mode: VIDEO    If the visit is dropped, the patient can be reconnected by:TELEPHONE VISIT: Phone number:   Telephone Information:   Mobile [phone]       Will anyone else be joining the visit? NO  (If patient encounters technical issues they should call [phone])    Are changes needed to the allergy or medication list? Pt stated no med changes    Are refills needed on medications prescribed by this physician? NO    Rooming Documentation:  Questionnaire(s) completed    Reason for visit: Headache    Natasha SCHMITTF

## 2025-06-19 ENCOUNTER — RESULTS FOLLOW-UP (OUTPATIENT)
Dept: CARDIOLOGY | Facility: CLINIC | Age: 40
End: 2025-06-19

## 2025-06-19 NOTE — PROGRESS NOTES
Video-Visit Details    Type of service:  Video Visit   Originating Location (pt. Location): Home    Distant Location (provider location):  Off-site  Platform used for Video Visit: Bethanie  Signed Electronically by: Pallavi David MD

## 2025-06-23 ASSESSMENT — ANXIETY QUESTIONNAIRES
7. FEELING AFRAID AS IF SOMETHING AWFUL MIGHT HAPPEN: NOT AT ALL
GAD7 TOTAL SCORE: 0
8. IF YOU CHECKED OFF ANY PROBLEMS, HOW DIFFICULT HAVE THESE MADE IT FOR YOU TO DO YOUR WORK, TAKE CARE OF THINGS AT HOME, OR GET ALONG WITH OTHER PEOPLE?: NOT DIFFICULT AT ALL
GAD7 TOTAL SCORE: 0

## 2025-06-24 ENCOUNTER — VIRTUAL VISIT (OUTPATIENT)
Dept: NEUROLOGY | Facility: CLINIC | Age: 40
End: 2025-06-24
Payer: COMMERCIAL

## 2025-06-24 DIAGNOSIS — M54.2 NECK PAIN: ICD-10-CM

## 2025-06-24 DIAGNOSIS — G43.709 CHRONIC MIGRAINE WITHOUT AURA WITHOUT STATUS MIGRAINOSUS, NOT INTRACTABLE: ICD-10-CM

## 2025-06-24 DIAGNOSIS — R20.0 LEFT ARM NUMBNESS: Primary | ICD-10-CM

## 2025-06-24 PROCEDURE — 1125F AMNT PAIN NOTED PAIN PRSNT: CPT | Mod: 95 | Performed by: NURSE PRACTITIONER

## 2025-06-24 PROCEDURE — 98005 SYNCH AUDIO-VIDEO EST LOW 20: CPT | Performed by: NURSE PRACTITIONER

## 2025-06-24 ASSESSMENT — HEADACHE IMPACT TEST (HIT 6)
HIT6 TOTAL SCORE: 64
WHEN YOU HAVE HEADACHES HOW OFTEN IS THE PAIN SEVERE: SOMETIMES
HOW OFTEN HAVE YOU FELT TOO TIRED TO WORK BECAUSE OF YOUR HEADACHES: VERY OFTEN
HOW OFTEN DID HEADACHS LIMIT CONCENTRATION ON WORK OR DAILY ACTIVITY: VERY OFTEN
HOW OFTEN DO HEADACHES LIMIT YOUR DAILY ACTIVITIES: VERY OFTEN
WHEN YOU HAVE A HEADACHE HOW OFTEN DO YOU WISH YOU COULD LIE DOWN: VERY OFTEN
HOW OFTEN HAVE YOU FELT FED UP OR IRRITATED BECAUSE OF YOUR HEADACHES: SOMETIMES

## 2025-06-24 ASSESSMENT — ACTIVITIES OF DAILY LIVING (ADL): I_KEEP_THINKING_ABOUT_HOW_BADLY_I_WANT_THE_PAIN_TO_STOP: 2 = TO A MODERATE DEGREE

## 2025-06-24 NOTE — NURSING NOTE
Current patient location: [address] [place] [address]    Is the patient currently in the state of [place]? YES    Visit mode: VIDEO    If the visit is dropped, the patient can be reconnected by:TELEPHONE VISIT: Phone number:   Telephone Information:   Mobile [phone]       Will anyone else be joining the visit? NO  (If patient encounters technical issues they should call [phone])    Are changes needed to the allergy or medication list? Pt stated no med changes    Are refills needed on medications prescribed by this physician? NO    Rooming Documentation:  Questionnaire(s) completed    Reason for visit: Headache    Natasha SCHMITTF

## 2025-06-24 NOTE — LETTER
6/24/2025       RE: Dustin Rainey  [address]  [facility] [address]     Dear Colleague,    Thank you for referring your patient, Dustin Rainey, to the [facility] NEUROLOGY CLINIC Register at [facility]. Please see a copy of my visit note below.    Virtual Visit Details  Type of service:  Video Visit   Originating Location (pt. Location): Home  Distant Location (provider location):  On-site  Platform used for Video Visit: [facility] HEADACHE CLINIC Register  COMPREHENSIVE INTERDISCIPLINARY HEADACHE MANAGEMENT PROGRAM   [address]  3RD FLOOR  [facility] [address]  TEL: [phone]                                                            Comprehensive Interdisciplinary Headache Team  Erica WebbD  Pallavi David MD PM&R  Tay Aguilar MD Neurology, Headache Specialist          Dear Elizabeth ,   We had the pleasure of seeing your patient, Mr Rainey at our comprehensive headache clinic on 6/18/2025. As you know, this patient is a 40-year-old who had a long standing  headaches.      IMPRESSION:   Chronic migraine without aura  Possible medication overuse headache, Excedrin/caffeine  PLAN:   -MRI brain wo and w contrast  -MRI cervical spine wo and w contrast  -neck pain as per Dr David's note-recommended scoliosis screening xray at the [facility] [place]. Consider NCS/EMG of LUE and LLE pending his course.  recommend PT with use of modalities to help with ROM, tightness and also to establish a HEP for core strengthening. Can also consider OTN options for TENS if he is not interested in PT. Also recommended ergonomic assessment of his desk and work station.   Excedrin off x at least 2 weeks, if restarted, limit to 9 days per month or less-did not try it yet-would like to plan it out-may be in August   -limit caffeine to less than 200 mg total daily in the future  -Continue Nurtec 75 mg ODT every other day   Adding  Botox with PREEMPT protocol  Return to clinic in 3 months if stable or sooner if needed.     25 minutes spent on the date of the encounter doing video access, chart  review,  meds review, treatment plan, documentation and further activities as noted above    Yolis BROWN, LUKE  Comprehensive Headache Clinic/Neurology  [phone]              Again, thank you for allowing me to participate in the care of your patient.      Sincerely,    KEVIN Christianson CNP

## 2025-06-24 NOTE — PROGRESS NOTES
[facility] HEADACHE CLINIC [place]  COMPREHENSIVE INTERDISCIPLINARY HEADACHE MANAGEMENT PROGRAM   [address]  3RD FLOOR  [facility] [address]  TEL: [phone]                                                            Comprehensive Interdisciplinary Headache Team  Erica Nix PharmD  Pallavi David MD PM&R  Tay Aguilar MD Neurology, Headache Specialist          Dear Drs ,   We had the pleasure of seeing your patient, Mr Rainey at our comprehensive headache clinic on 6/18/2025. As you know, this patient is a 40-year-old who had a long standing  headaches.      IMPRESSION:   Chronic migraine without aura  Possible medication overuse headache, Excedrin/caffeine  PLAN:   -MRI brain wo and w contrast  -MRI cervical spine wo and w contrast  -neck pain as per Dr David's note-recommended scoliosis screening xray at the [facility] [place]. Consider NCS/EMG of LUE and LLE pending his course.  recommend PT with use of modalities to help with ROM, tightness and also to establish a HEP for core strengthening. Can also consider OTN options for TENS if he is not interested in PT. Also recommended ergonomic assessment of his desk and work station.   Excedrin off x at least 2 weeks, if restarted, limit to 9 days per month or less-did not try it yet-would like to plan it out-may be in August   -limit caffeine to less than 200 mg total daily in the future  -Continue Nurtec 75 mg ODT every other day   Adding Botox with PREEMPT protocol  Return to clinic in 3 months if stable or sooner if needed.     25 minutes spent on the date of the encounter doing video access, chart  review,  meds review, treatment plan, documentation and further activities as noted above    Yolis BROWN, LUKE  Comprehensive Headache Clinic/Neurology  [phone]            
Virtual Visit Details  Type of service:  Video Visit   Originating Location (pt. Location): Home  Distant Location (provider location):  On-site  Platform used for Video Visit: Bethanie  
DISPLAY PLAN FREE TEXT

## 2025-06-26 DIAGNOSIS — G43.709 CHRONIC MIGRAINE WITHOUT AURA WITHOUT STATUS MIGRAINOSUS, NOT INTRACTABLE: Primary | ICD-10-CM

## 2025-07-13 ASSESSMENT — HEADACHE IMPACT TEST (HIT 6)
HOW OFTEN DID HEADACHS LIMIT CONCENTRATION ON WORK OR DAILY ACTIVITY: VERY OFTEN
HIT6 TOTAL SCORE: 64
WHEN YOU HAVE A HEADACHE HOW OFTEN DO YOU WISH YOU COULD LIE DOWN: VERY OFTEN
HOW OFTEN DO HEADACHES LIMIT YOUR DAILY ACTIVITIES: VERY OFTEN
HOW OFTEN HAVE YOU FELT FED UP OR IRRITATED BECAUSE OF YOUR HEADACHES: VERY OFTEN
WHEN YOU HAVE HEADACHES HOW OFTEN IS THE PAIN SEVERE: SOMETIMES
HOW OFTEN HAVE YOU FELT TOO TIRED TO WORK BECAUSE OF YOUR HEADACHES: SOMETIMES

## 2025-07-14 ENCOUNTER — OFFICE VISIT (OUTPATIENT)
Dept: NEUROLOGY | Facility: CLINIC | Age: 40
End: 2025-07-14
Payer: COMMERCIAL

## 2025-07-14 ENCOUNTER — ANCILLARY PROCEDURE (OUTPATIENT)
Dept: GENERAL RADIOLOGY | Facility: CLINIC | Age: 40
End: 2025-07-14
Attending: NURSE PRACTITIONER
Payer: COMMERCIAL

## 2025-07-14 DIAGNOSIS — R20.0 LEFT ARM NUMBNESS: ICD-10-CM

## 2025-07-14 DIAGNOSIS — M54.2 NECK PAIN: ICD-10-CM

## 2025-07-14 DIAGNOSIS — G43.709 CHRONIC MIGRAINE WITHOUT AURA WITHOUT STATUS MIGRAINOSUS, NOT INTRACTABLE: Primary | ICD-10-CM

## 2025-07-14 PROCEDURE — 72083 X-RAY EXAM ENTIRE SPI 4/5 VW: CPT | Performed by: HEALTH CARE PROVIDER

## 2025-07-14 PROCEDURE — 64615 CHEMODENERV MUSC MIGRAINE: CPT | Performed by: PSYCHIATRY & NEUROLOGY

## 2025-07-14 NOTE — LETTER
"7/14/2025       RE: Dustin Rainey  [address] [facility] [address]     Dear Colleague,    Thank you for referring your patient, Dustin Rainey, to the [facility] NEUROLOGY CLINIC [place] [facility]. Please see a copy of my visit note below.    [facility]  Botulinum Toxin Procedure    Lauren Cross MD  Headache Neurology    July 14, 2025    Procedure:  OnabotulinumtoxinA injections for chronic migraine  Indication:  Chronic migraine    Mr. Rainey suffers from severe intractable headaches.  He was referred by Yolis Linn NP for onabotulinumtoxinA injections for headache.  Risks, benefits, and alternatives were discussed.  All questions were answered and consent given.  He decided to proceed with the injections.     Headache history, from initial consult note: \"Starts with pressure on the right side behind his right eye. Rarely on left side. It is described as growing from light to worsening throughout the day to be severe. Feels like head is in a vice, throbbing when severe. Severe pain lasts all day. Rates 7/10 on a bad day, 4-5/10 at baseline. Improves if he takes medication.   Associated with nausea, vomiting, photophobia, phonophobia.\"    Prior to initiation of botulinum toxin injections, Mr. Rainey reported 30 headache days per month, with 10 severe headache days per month. His headaches are quite disabling and often interfere with his ability to function normally. He misses at least part of a work day 4 days per month prior to starting Botox injections.    Date of last injections: first set of injections   Date of last office visit: June 24, 2025    He has attempted other migraine prophylactic treatments in the past, which have included:   Excedrin tried to stop it in May only for a day -1-2 pills /day and some day wakes up fine but than headache coming on in the afternoon and takes Excedrin and feels good. Tried to " stop it and developed a severe headaches.   Emgality tried -was not very effective for 9 months. Headache tends to grow more severe and if does not stop it than headache is more severe.   Amitriptyline caused tiredness feeling in the higher dose and did not help with headache, a profound effect and lack of energy when amitriptyline dose was increased to 30 mg  Nortriptyline in 2018 and did not work   Propranolol -no relief with headaches  Rizatriptan-currently and works most of the time  Avril triptan  Sumatriptan-stopped working   Topiramate-stopped working  Gabapentin  Nurtec every other day -no difference.      He currently takes Nurtec every other day for headache prevention.    Mr. Rainey's pain was assessed prior to the procedure.  He rated his pain today as 1 out of 10.    Procedural Pause: Procedural pause was conducted to verify correct patient identity, procedure to be performed, correct side and site, correct patient position, and special requirements. Appropriate hand hygiene was utilized, and each injection site was prepped with alcohol wipe or Chloraprep swab.     Procedure Details: 200 units of onabotulinumtoxinA was diluted in 4 mL 0.9% normal saline. A total of 150 units of onabotulinumtoxinA were injected using 30 gauge 0.5 in needles into the muscles listed below. 50 units of onabotulinumtoxinA were wasted.     Injection Sites: Total = 150 units onabotulinumtoxinA      and Procerus muscles - 5 units into the left and right corrugators and 5 units into the procerus (15 units total)    Frontalis muscles - 5 units into the left superior frontalis and 5 units into the right superior frontalis (2 injection sites per muscle) (10 units total)    Temporalis muscles - 12.5 units into the left temporalis muscle and 12.5 units into the right temporalis muscle (2 injection sites per muscle) (25 units total)    Occipitalis muscles - 12.5 units into the left occipitalis muscle and 12.5 units into the  right occipitalis muscle (2 injection sites per muscle) (25 units total)    Splenius Capitis muscles - 12.5 units into the left splenius capitis muscle and 12.5 units into the right splenius capitis muscle (2 injection sites per muscle, divided into 2/3 anteriorly and 1/3 posteriorly) (25 units total)      Trapezius muscles - 25 units into the left trapezius muscle and 25 units into the right trapezius muscle (3 injection sites per muscle, divided 5 units, 10 units, 10 units, medial to lateral) (50 units total)    Mr. Rainey tolerated the procedure well without immediate complications.  He will follow up in clinic for assessment of the effectiveness of treatment.  He did not report any change in his pain level after the botulinumtoxinA injection procedure.    Lauren Cross MD  Headache Neurology  [facility]      Again, thank you for allowing me to participate in the care of your patient.      Sincerely,    Lauren Cross MD

## 2025-07-14 NOTE — PROGRESS NOTES
"[facility]  Botulinum Toxin Procedure    Lauren Cross MD  Headache Neurology    July 14, 2025    Procedure:  OnabotulinumtoxinA injections for chronic migraine  Indication:  Chronic migraine    Mr. Rainey suffers from severe intractable headaches.  He was referred by Yolis Linn NP for onabotulinumtoxinA injections for headache.  Risks, benefits, and alternatives were discussed.  All questions were answered and consent given.  He decided to proceed with the injections.     Headache history, from initial consult note: \"Starts with pressure on the right side behind his right eye. Rarely on left side. It is described as growing from light to worsening throughout the day to be severe. Feels like head is in a vice, throbbing when severe. Severe pain lasts all day. Rates 7/10 on a bad day, 4-5/10 at baseline. Improves if he takes medication.   Associated with nausea, vomiting, photophobia, phonophobia.\"    Prior to initiation of botulinum toxin injections, Mr. Rainey reported 30 headache days per month, with 10 severe headache days per month. His headaches are quite disabling and often interfere with his ability to function normally. He misses at least part of a work day 4 days per month prior to starting Botox injections.    Date of last injections: first set of injections   Date of last office visit: June 24, 2025    He has attempted other migraine prophylactic treatments in the past, which have included:   Excedrin tried to stop it in May only for a day -1-2 pills /day and some day wakes up fine but than headache coming on in the afternoon and takes Excedrin and feels good. Tried to stop it and developed a severe headaches.   Emgality tried -was not very effective for 9 months. Headache tends to grow more severe and if does not stop it than headache is more severe.   Amitriptyline caused tiredness feeling in the higher dose and did not help with headache, a profound effect and lack of energy when " amitriptyline dose was increased to 30 mg  Nortriptyline in 2018 and did not work   Propranolol -no relief with headaches  Rizatriptan-currently and works most of the time  Avril triptan  Sumatriptan-stopped working   Topiramate-stopped working  Gabapentin  Nurtec every other day -no difference.      He currently takes Nurtec every other day for headache prevention.    Mr. Wilds pain was assessed prior to the procedure.  He rated his pain today as 1 out of 10.    Procedural Pause: Procedural pause was conducted to verify correct patient identity, procedure to be performed, correct side and site, correct patient position, and special requirements. Appropriate hand hygiene was utilized, and each injection site was prepped with alcohol wipe or Chloraprep swab.     Procedure Details: 200 units of onabotulinumtoxinA was diluted in 4 mL 0.9% normal saline. A total of 150 units of onabotulinumtoxinA were injected using 30 gauge 0.5 in needles into the muscles listed below. 50 units of onabotulinumtoxinA were wasted.     Injection Sites: Total = 150 units onabotulinumtoxinA      and Procerus muscles - 5 units into the left and right corrugators and 5 units into the procerus (15 units total)    Frontalis muscles - 5 units into the left superior frontalis and 5 units into the right superior frontalis (2 injection sites per muscle) (10 units total)    Temporalis muscles - 12.5 units into the left temporalis muscle and 12.5 units into the right temporalis muscle (2 injection sites per muscle) (25 units total)    Occipitalis muscles - 12.5 units into the left occipitalis muscle and 12.5 units into the right occipitalis muscle (2 injection sites per muscle) (25 units total)    Splenius Capitis muscles - 12.5 units into the left splenius capitis muscle and 12.5 units into the right splenius capitis muscle (2 injection sites per muscle, divided into 2/3 anteriorly and 1/3 posteriorly) (25 units total)      Trapezius  muscles - 25 units into the left trapezius muscle and 25 units into the right trapezius muscle (3 injection sites per muscle, divided 5 units, 10 units, 10 units, medial to lateral) (50 units total)    Mr. Rainey tolerated the procedure well without immediate complications.  He will follow up in clinic for assessment of the effectiveness of treatment.  He did not report any change in his pain level after the botulinumtoxinA injection procedure.    Lauren Cross MD  Headache Neurology  [facility]

## 2025-07-19 ENCOUNTER — HEALTH MAINTENANCE LETTER (OUTPATIENT)
Age: 40
End: 2025-07-19